# Patient Record
Sex: FEMALE | Race: WHITE | NOT HISPANIC OR LATINO | Employment: OTHER | URBAN - METROPOLITAN AREA
[De-identification: names, ages, dates, MRNs, and addresses within clinical notes are randomized per-mention and may not be internally consistent; named-entity substitution may affect disease eponyms.]

---

## 2020-10-25 ENCOUNTER — VIRTUAL VISIT (OUTPATIENT)
Dept: URGENT CARE | Facility: CLINIC | Age: 80
End: 2020-10-25
Payer: MEDICARE

## 2020-10-25 ENCOUNTER — NURSE TRIAGE (OUTPATIENT)
Dept: NURSING | Facility: CLINIC | Age: 80
End: 2020-10-25

## 2020-10-25 DIAGNOSIS — Z20.822 SUSPECTED 2019 NOVEL CORONAVIRUS INFECTION: Primary | ICD-10-CM

## 2020-10-25 PROCEDURE — 99441 PR PHYSICIAN TELEPHONE EVALUATION 5-10 MIN: CPT | Performed by: PHYSICIAN ASSISTANT

## 2020-10-25 NOTE — PATIENT INSTRUCTIONS
Patient Education     Coronavirus Disease 2019 (COVID-19): Caring for Yourself or Others   If you or a household member have symptoms of COVID-19, follow the guidelines below. This will help you manage symptoms and keep the virus from spreading.  If you have symptoms of COVID-19    Stay home and contact your care team. They will tell you what to do.    Don t panic. Keep in mind that other illnesses can cause similar symptoms.    Stay away from work, school, and public places.    Limit physical contact with others in your home. Limit visitors. No kissing.  Clean surfaces you touch with disinfectant.  If you need to cough or sneeze, do it into a tissue. Then throw the tissue into the trash. If you don't have tissues, cough or sneeze into the bend of your elbow.  Don t share food or personal items with people in your household. This includes items like eating and drinking utensils, towels, and bedding.  Wear a cloth face mask around other people. During a public health emergency, medical face masks may be reserved for healthcare workers. You may need to make a cloth face mask of your own. You can do this using a bandana, T-shirt, or other cloth. The CDC has instructions on how to make a face mask. Wear the mask so that it covers both your nose and mouth.  If you need to go to a hospital or clinic, call ahead to let them know. Expect the care team to wear masks, gowns, gloves, and eye protection. You may be put in a separate room.  Follow all instructions from your care team.    If you ve been diagnosed with COVID-19    Stay home and away from others, including other people in your home. (This is called self-isolation.) Don t leave home unless you need to get medical care. Don t go to work, school, or public places. Don t use buses, taxis, or other public transportation.    Follow all instructions from your care team.    If you need to go to a hospital or clinic, call ahead to let them know. Expect the care team to wear  masks, gowns, gloves, and eye protection. You may be put in a separate room.    Wear a face mask over your nose and mouth. This is to protect others from your germs. If you can t wear a mask, your caregivers should wear one. You may need to make your own mask using a bandana, T-shirt, or other cloth. See the [facility] s instructions on how to make a face mask.    Have no contact with pets and other animals.    Don t share food or personal items with people in your household. This includes items like eating and drinking utensils, towels, and bedding.    If you need to cough or sneeze, do it into a tissue. Then throw the tissue into the trash. If you don't have tissues, cough or sneeze into the bend of your elbow.    Wash your hands often.    Self-care at home   At this time, there is no medicine approved to prevent or treat COVID-19. Experts are testing different medicines, trying to find one that works.  So far, the most proven treatment is to support your body while it fights the virus.    Getting extra rest.    Drink extra fluids 6 to 8 glasses of liquids each day), unless a doctor has told you not to. Ask your care team which fluids are best for you. Avoid fluids that contain caffeine or alcohol.    Taking over-the-counter (OTC) medicine to reduce pain and fever. Your care team will tell you which medicine to use.  If you ve been in the hospital for COVID-19, follow your care team s instructions. They will tell you when to stop self-isolation. They may also have you change positions to help your breathing (such as lying on your belly).  If a test showed that you have COVID-19, you may be asked to donate plasma after you ve recovered. (This is called COVID-19 convalescent plasma donation.) The plasma may contain antibodies to help fight the virus in others. Scientists are testing whether this might be a treatment in the future. For more information, talk to your care team.  Caring for a sick person     Follow all  instructions from the care team.    Wash your hands often.    Wear protective clothing as advised.    Make sure the sick person wears a mask. If they can't wear a mask, don t stay in the same room with them. If you must be in the same room, wear a face mask. Make sure the mask covers both the nose and mouth.    Keep track of the sick person s symptoms.    Clean surfaces often with disinfectant. This includes phones, kitchen counters, fridge door handles, bathroom surfaces, and others.    Don t let anyone share household items with the sick person. This includes eating and drinking tools, towels, sheets, or blankets.    Clean fabrics and laundry well.    Keep other people and pets away from the sick person.    When you can stop self-isolation  When you are sick with COVID-19, you should stay away from other people. This is called self-isolation. The rules for ending self-isolation depend on your health in general.  If you are normally healthy  You can stop self-isolation when all 3 of these are true:    You ve had no fever--and no medicine that reduces fever--for at least 24 hours. And     Your symptoms are better, such as cough or trouble breathing. And     At least 10 days have passed since your symptoms first started.  Talk with your care team before you leave home. They may tell you it s okay to leave, or they may give you different advice.  If you have a weak immune system  If you re being treated for cancer, have an immune disorder (such as HIV), or have had a transplant &$40;organ or bone marrow), follow your care team s instructions. You may be asked to stay home from 10 days to 20 days after your symptoms first started. Your care team may want to re-test you for COVID-19.  When you return to public settings  When you are well enough to go outside your home, follow the CDC s guidance on cloth face masks.    Anyone over age 2 should wear a face mask in public, especially when it's hard to socially distance.  This includes public transit, public protests and marches, and crowded stores, bars, and restaurants.    Face masks are most likely to reduce the spread of COVID-19 when they are widely used by people who are out in the public.  Certain people should not wear a face covering. These include:    Children under 2 years old    Anyone with a health, developmental, or mental health condition that can be made worse by wearing a mask    Anyone who is unconscious or unable to remove the face covering without help. See the CDC's guidance on who should not wear a face mask.  When to call your care team  Call your care team right away if a sick person has any of these:    Trouble breathing    Pain or pressure in chest  If a sick person has any of these, call 911:    Trouble breathing that gets worse    Pain or pressure in chest that gets worse    Blue tint to lips or face    Fast or irregular heartbeat    Confusion or trouble waking    Fainting or loss of consciousness    Coughing up blood  Going home from the hospital   If you have COVID-19 and were recently in the hospital:    Follow the instructions above for self-care and isolation.    Follow the hospital care team s instructions.    Ask questions if anything is unclear to you. Write down answers so you remember them.  Date last modified: 10/13/2020  Lisa last reviewed this educational content on 4/1/2020  This information has been modified by your health care provider with permission from the publisher.    4822-8671 [employer]. [address] [place] [address], [place] [address]. All rights reserved. This information is not intended as a substitute for professional medical care. Always follow your healthcare professional's instructions.

## 2020-10-25 NOTE — TELEPHONE ENCOUNTER
Hx of IDDB .  Caller is complaining of fever 101 orally, scratchy throat, cough, sniffles in nose. Caller states she was at a gathering that included 6 people in a garage. Caller states 4 out of the 6 people there are having covid-19 symptoms. Triage guidelines recommend to [email or web address]. Caller verbalized and understands directives  FNA triage call :   Presenting problem : Pt called . Covid suspected Sx 10/24/20 , and exposed to ? Suspected covid Sx . Currently : 1&0 , eating and Homebound  activity and Blood sugar is 101 now.   Guideline used : Covid suspected A AH.   Disposition and recommendations :  COVID 19 Nurse Triage Plan/Patient Instructions    Please be aware that novel coronavirus (COVID-19) may be circulating in the community. If you develop symptoms such as fever, cough, or SOB or if you have concerns about the presence of another infection including coronavirus (COVID-19), please contact your health care provider or visit [email or web address].     Disposition/Instructions/ Pt instructed in Self isolation     Home care recommended. Follow home care protocol based instructions.  Virtual Visit with provider recommended. Reference Visit Selection Guide.    Thank you for taking steps to prevent the spread of this virus.  o Limit your contact with others.  o Wear a simple mask to cover your cough.  o Wash your hands well and often.  Caller verbalizes understanding and denies further questions and will call back if further symptoms to triage or questions  . Miranda Lowery RN  - [facility] Nurse Advisor     Reason for Disposition    [1] Fever > 101 F (38.3 C) AND [2] age > 60    Additional Information    Negative: SEVERE difficulty breathing (e.g., struggling for each breath, speaks in single words)    Negative: Difficult to awaken or acting confused (e.g., disoriented, slurred speech)    Negative: Bluish (or gray) lips or face now    Negative: Shock suspected (e.g., cold/pale/clammy skin, too weak to stand, low BP,  rapid pulse)    Negative: Sounds like a life-threatening emergency to the triager    Negative: [1] COVID-19 exposure AND [2] no symptoms    Negative: COVID-19 and Breastfeeding, questions about    Negative: [1] Adult with possible COVID-19 symptoms AND [2] triager concerned about severity of symptoms or other causes    Negative: SEVERE or constant chest pain or pressure (Exception: mild central chest pain, present only when coughing)    Negative: MODERATE difficulty breathing (e.g., speaks in phrases, SOB even at rest, pulse 100-120)    Negative: Patient sounds very sick or weak to the triager    Negative: MILD difficulty breathing (e.g., minimal/no SOB at rest, SOB with walking, pulse <100)    Negative: Chest pain or pressure    Negative: Fever > 103 F (39.4 C)    Protocols used: CORONAVIRUS (COVID-19) DIAGNOSED OR [id number] 8.4.20

## 2020-10-25 NOTE — PROGRESS NOTES
"Mercedes Gomez is a 80 year old female who is being evaluated via a billable telephone visit.      The patient has been notified of following:     \"This telephone visit will be conducted via a call between you and your physician/provider. We have found that certain health care needs can be provided without the need for a physical exam.  This service lets us provide the care you need with a short phone conversation.  If a prescription is necessary we can send it directly to your pharmacy.  If lab work is needed we can place an order for that and you can then stop by our lab to have the test done at a later time.    Telephone visits are billed at different rates depending on your insurance coverage. During this emergency period, for some insurers they may be billed the same as an in-person visit.  Please reach out to your insurance provider with any questions.    If during the course of the call the physician/provider feels a telephone visit is not appropriate, you will not be charged for this service.\"    Patient has given verbal consent for Telephone visit?  Yes    What phone number would you like to be contacted at?     How would you like to obtain your AVS? Mail a copy    Subjective     Mercedes Gomez is a 80 year old female who presents via phone visit today for the following health issues:  Cough and fever, congestion    HPI  Symptoms started yesterday  Fever of 101 this morning.   Cough is nonproductive. Has been taking diabetic cough medicine that helped clear her cough up last night.  Has stuffy nose and mild headache.   No vomiting/diarrhea.   No loss of taste or smell.   No chest pain or shortness of breath.  No sick or known COVID contacts.   She goes to physical therapy weekly.     Review of Systems   Constitutional, HEENT, cardiovascular, pulmonary, gi and gu systems are negative, except as otherwise noted.       Objective          Vitals:  No vitals were obtained today due to virtual " visit.    healthy, alert and no distress  PSYCH: Alert and oriented times 3; coherent speech, normal   rate and volume, able to articulate logical thoughts, able   to abstract reason, no tangential thoughts, no hallucinations   or delusions  Her affect is normal  RESP: No cough, no audible wheezing, able to talk in full sentences  Remainder of exam unable to be completed due to telephone visits    No results found for this or any previous visit (from the past 24 hour(s)).        Assessment/Plan:    Assessment & Plan     Mercedes was seen today for cough.    Diagnoses and all orders for this visit:    Suspected 2019 novel coronavirus infection  -     Symptomatic COVID-19 Virus (Coronavirus) by PCR; Future    Continue cough medicine as needed for cough.       Diagnosis and treatment plan were discussed with patient and/or parent. If symptoms worsen or do not improve in the next few days, follow-up with your primary care provider or visit an [facility] urgent care clinic location.  Patient verbalizes understanding of all things discussed. All questions were addressed and answered.       See Patient Instructions    Return in about 1 week (around 11/1/2020) for If not better, sooner if worsening.      Genoveva Mittal PA-C    Virtual Urgent Care  [facility] VIRTUAL URGENT CARE    Phone call duration:  7 minutes

## 2020-10-25 NOTE — TELEPHONE ENCOUNTER
[id number] Caller states she does not feel well and can't do the virtual visit with a provider on [facility]. Caller advised to call back at 0730 to have provider paged to discuss symptoms. Caller verbalized and understands directives.        Caller is complaining of fever 101 orally, scratchy throat, cough, sniffles in nose. Caller states she was at a gathering that included 6 people in a garage. Caller states 4 out of the 6 people there are having covid-19 symptoms. Triage guidelines recommend to [email or web address]. Caller verbalized and understands directives.  COVID 19 Nurse Triage Plan/Patient Instructions    Please be aware that novel coronavirus (COVID-19) may be circulating in the community. If you develop symptoms such as fever, cough, or SOB or if you have concerns about the presence of another infection including coronavirus (COVID-19), please contact your health care provider or visit [email or web address].     Disposition/Instructions    Virtual Visit with provider recommended. Reference Visit Selection Guide.    Thank you for taking steps to prevent the spread of this virus.  o Limit your contact with others.  o Wear a simple mask to cover your cough.  o Wash your hands well and often.    Resources    [facility]: About COVID-19: [email or web address]    CDC: What to Do If You're Sick: www.cdc.gov/coronavirus/2019-ncov/about/steps-when-sick.html    CDC: Ending Home Isolation: www.cdc.gov/coronavirus/2019-ncov/hcp/disposition-in-home-patients.html     CDC: Caring for Someone: www.cdc.gov/coronavirus/2019-ncov/if-you-are-sick/care-for-someone.html     [facility]: Interim Guidance for Hospital Discharge to Home: www.health.[facility].[place]/diseases/coronavirus/hcp/hospdischarge.pdf    [facility] clinical trials (COVID-19 research studies): [email or web address]-trials     Below are the COVID-19 hotlines at the [place] Department of [facility] ([facility]). Interpreters are available.   o For health  questions: Call 845-056-6741 or 1-424.692.1106 (7 a.m. to 7 p.m.)  o For questions about schools and childcare: Call 325-675-2756 or 1-628.793.5953 (7 a.m. to 7 p.m.)                     Reason for Disposition    [1] Fever > 101 F (38.3 C) AND [2] age > 60    Additional Information    Negative: SEVERE difficulty breathing (e.g., struggling for each breath, speaks in single words)    Negative: Difficult to awaken or acting confused (e.g., disoriented, slurred speech)    Negative: Bluish (or gray) lips or face now    Negative: Shock suspected (e.g., cold/pale/clammy skin, too weak to stand, low BP, rapid pulse)    Negative: Sounds like a life-threatening emergency to the triager    Negative: [1] COVID-19 exposure AND [2] no symptoms    Negative: COVID-19 and Breastfeeding, questions about    Negative: [1] Adult with possible COVID-19 symptoms AND [2] triager concerned about severity of symptoms or other causes    Negative: SEVERE or constant chest pain or pressure (Exception: mild central chest pain, present only when coughing)    Negative: MODERATE difficulty breathing (e.g., speaks in phrases, SOB even at rest, pulse 100-120)    Negative: Patient sounds very sick or weak to the triager    Negative: MILD difficulty breathing (e.g., minimal/no SOB at rest, SOB with walking, pulse <100)    Negative: Chest pain or pressure    Negative: Fever > 103 F (39.4 C)    Protocols used: CORONAVIRUS (COVID-19) DIAGNOSED OR [id number] 8.4.20

## 2021-07-02 ENCOUNTER — HOSPITAL ENCOUNTER (OUTPATIENT)
Facility: CLINIC | Age: 81
Setting detail: OBSERVATION
Discharge: HOME | End: 2021-07-02
Attending: EMERGENCY MEDICINE | Admitting: INTERNAL MEDICINE
Payer: MEDICARE

## 2021-07-02 VITALS
RESPIRATION RATE: 16 BRPM | HEART RATE: 76 BPM | DIASTOLIC BLOOD PRESSURE: 50 MMHG | OXYGEN SATURATION: 98 % | BODY MASS INDEX: 39.56 KG/M2 | SYSTOLIC BLOOD PRESSURE: 134 MMHG | WEIGHT: 215 LBS | HEIGHT: 62 IN | TEMPERATURE: 98.4 F

## 2021-07-02 DIAGNOSIS — Z79.01 LONG TERM (CURRENT) USE OF ANTICOAGULANTS: ICD-10-CM

## 2021-07-02 DIAGNOSIS — Z11.52 ENCOUNTER FOR SCREENING LABORATORY TESTING FOR COVID-19 VIRUS: ICD-10-CM

## 2021-07-02 DIAGNOSIS — M62.81 GENERALIZED MUSCLE WEAKNESS: ICD-10-CM

## 2021-07-02 DIAGNOSIS — I48.91 ATRIAL FIBRILLATION WITH RAPID VENTRICULAR RESPONSE (H): ICD-10-CM

## 2021-07-02 PROBLEM — Z95.818 PRESENCE OF OTHER CARDIAC IMPLANTS AND GRAFTS: Status: ACTIVE | Noted: 2020-07-23

## 2021-07-02 LAB
ALBUMIN SERPL-MCNC: 3.5 G/DL (ref 3.4–5)
ALBUMIN UR-MCNC: NEGATIVE MG/DL
ALP SERPL-CCNC: 86 U/L (ref 40–150)
ALT SERPL W P-5'-P-CCNC: 18 U/L (ref 0–50)
ANION GAP SERPL CALCULATED.3IONS-SCNC: 2 MMOL/L (ref 3–14)
APPEARANCE UR: CLEAR
AST SERPL W P-5'-P-CCNC: 20 U/L (ref 0–45)
BASOPHILS # BLD AUTO: 0.1 10E9/L (ref 0–0.2)
BASOPHILS NFR BLD AUTO: 0.6 %
BILIRUB SERPL-MCNC: 0.6 MG/DL (ref 0.2–1.3)
BILIRUB UR QL STRIP: NEGATIVE
BUN SERPL-MCNC: 22 MG/DL (ref 7–30)
CALCIUM SERPL-MCNC: 8.8 MG/DL (ref 8.5–10.1)
CHLORIDE SERPL-SCNC: 107 MMOL/L (ref 94–109)
CO2 SERPL-SCNC: 32 MMOL/L (ref 20–32)
COLOR UR AUTO: YELLOW
CREAT SERPL-MCNC: 1.09 MG/DL (ref 0.52–1.04)
DIFFERENTIAL METHOD BLD: NORMAL
EOSINOPHIL # BLD AUTO: 0.1 10E9/L (ref 0–0.7)
EOSINOPHIL NFR BLD AUTO: 1.6 %
ERYTHROCYTE [DISTWIDTH] IN BLOOD BY AUTOMATED COUNT: 13.1 % (ref 10–15)
GFR SERPL CREATININE-BSD FRML MDRD: 47 ML/MIN/{1.73_M2}
GLUCOSE SERPL-MCNC: 157 MG/DL (ref 70–99)
GLUCOSE UR STRIP-MCNC: NEGATIVE MG/DL
HCT VFR BLD AUTO: 43.7 % (ref 35–47)
HGB BLD-MCNC: 14.3 G/DL (ref 11.7–15.7)
HGB UR QL STRIP: NEGATIVE
IMM GRANULOCYTES # BLD: 0 10E9/L (ref 0–0.4)
IMM GRANULOCYTES NFR BLD: 0.4 %
KETONES UR STRIP-MCNC: NEGATIVE MG/DL
LABORATORY COMMENT REPORT: NORMAL
LACTATE BLD-SCNC: 0.8 MMOL/L (ref 0.7–2)
LEUKOCYTE ESTERASE UR QL STRIP: NEGATIVE
LYMPHOCYTES # BLD AUTO: 2 10E9/L (ref 0.8–5.3)
LYMPHOCYTES NFR BLD AUTO: 23.6 %
MCH RBC QN AUTO: 30.1 PG (ref 26.5–33)
MCHC RBC AUTO-ENTMCNC: 32.7 G/DL (ref 31.5–36.5)
MCV RBC AUTO: 92 FL (ref 78–100)
MONOCYTES # BLD AUTO: 0.6 10E9/L (ref 0–1.3)
MONOCYTES NFR BLD AUTO: 7.2 %
NEUTROPHILS # BLD AUTO: 5.7 10E9/L (ref 1.6–8.3)
NEUTROPHILS NFR BLD AUTO: 66.6 %
NITRATE UR QL: NEGATIVE
NRBC # BLD AUTO: 0 10*3/UL
NRBC BLD AUTO-RTO: 0 /100
PH UR STRIP: 7 PH (ref 5–7)
PLATELET # BLD AUTO: 225 10E9/L (ref 150–450)
POTASSIUM SERPL-SCNC: 4.2 MMOL/L (ref 3.4–5.3)
PROT SERPL-MCNC: 7.3 G/DL (ref 6.8–8.8)
RBC # BLD AUTO: 4.75 10E12/L (ref 3.8–5.2)
SARS-COV-2 RNA RESP QL NAA+PROBE: NEGATIVE
SODIUM SERPL-SCNC: 141 MMOL/L (ref 133–144)
SOURCE: NORMAL
SP GR UR STRIP: 1.01 (ref 1–1.03)
SPECIMEN SOURCE: NORMAL
TROPONIN I SERPL-MCNC: <0.015 UG/L (ref 0–0.04)
TSH SERPL DL<=0.005 MIU/L-ACNC: 1.4 MU/L (ref 0.4–4)
UROBILINOGEN UR STRIP-MCNC: 0 MG/DL (ref 0–2)
WBC # BLD AUTO: 8.5 10E9/L (ref 4–11)

## 2021-07-02 PROCEDURE — 93010 ELECTROCARDIOGRAM REPORT: CPT | Performed by: EMERGENCY MEDICINE

## 2021-07-02 PROCEDURE — 80053 COMPREHEN METABOLIC PANEL: CPT | Performed by: EMERGENCY MEDICINE

## 2021-07-02 PROCEDURE — G0378 HOSPITAL OBSERVATION PER HR: HCPCS

## 2021-07-02 PROCEDURE — 84443 ASSAY THYROID STIM HORMONE: CPT | Performed by: EMERGENCY MEDICINE

## 2021-07-02 PROCEDURE — 87635 SARS-COV-2 COVID-19 AMP PRB: CPT | Performed by: EMERGENCY MEDICINE

## 2021-07-02 PROCEDURE — 93005 ELECTROCARDIOGRAM TRACING: CPT | Mod: 76 | Performed by: EMERGENCY MEDICINE

## 2021-07-02 PROCEDURE — 96374 THER/PROPH/DIAG INJ IV PUSH: CPT | Performed by: EMERGENCY MEDICINE

## 2021-07-02 PROCEDURE — 258N000003 HC RX IP 258 OP 636: Performed by: EMERGENCY MEDICINE

## 2021-07-02 PROCEDURE — 83605 ASSAY OF LACTIC ACID: CPT | Performed by: EMERGENCY MEDICINE

## 2021-07-02 PROCEDURE — 85025 COMPLETE CBC W/AUTO DIFF WBC: CPT | Performed by: EMERGENCY MEDICINE

## 2021-07-02 PROCEDURE — 84484 ASSAY OF TROPONIN QUANT: CPT | Performed by: EMERGENCY MEDICINE

## 2021-07-02 PROCEDURE — 81003 URINALYSIS AUTO W/O SCOPE: CPT | Performed by: EMERGENCY MEDICINE

## 2021-07-02 PROCEDURE — 93010 ELECTROCARDIOGRAM REPORT: CPT | Mod: 76 | Performed by: EMERGENCY MEDICINE

## 2021-07-02 PROCEDURE — C9803 HOPD COVID-19 SPEC COLLECT: HCPCS | Performed by: EMERGENCY MEDICINE

## 2021-07-02 PROCEDURE — 250N000009 HC RX 250: Performed by: EMERGENCY MEDICINE

## 2021-07-02 PROCEDURE — 99285 EMERGENCY DEPT VISIT HI MDM: CPT | Mod: 25 | Performed by: EMERGENCY MEDICINE

## 2021-07-02 PROCEDURE — 99218 PR INITIAL OBSERVATION CARE,LEVEL I: CPT | Mod: AI | Performed by: PHYSICIAN ASSISTANT

## 2021-07-02 PROCEDURE — 96361 HYDRATE IV INFUSION ADD-ON: CPT | Performed by: EMERGENCY MEDICINE

## 2021-07-02 PROCEDURE — 93005 ELECTROCARDIOGRAM TRACING: CPT | Performed by: EMERGENCY MEDICINE

## 2021-07-02 RX ORDER — METOPROLOL SUCCINATE 25 MG/1
25 TABLET, EXTENDED RELEASE ORAL
Status: ON HOLD | COMMUNITY
Start: 2020-06-30 | End: 2021-07-02

## 2021-07-02 RX ORDER — CALCIUM CARBONATE 500(1250)
1250 TABLET,CHEWABLE ORAL
COMMUNITY
Start: 2020-06-30

## 2021-07-02 RX ORDER — DILTIAZEM HCL 60 MG
60 TABLET ORAL EVERY 6 HOURS SCHEDULED
Status: DISCONTINUED | OUTPATIENT
Start: 2021-07-02 | End: 2021-07-02

## 2021-07-02 RX ORDER — LISINOPRIL 5 MG/1
5 TABLET ORAL
COMMUNITY
Start: 2020-06-30

## 2021-07-02 RX ORDER — PRAVASTATIN SODIUM 40 MG
40 TABLET ORAL
COMMUNITY
Start: 2020-12-19

## 2021-07-02 RX ORDER — PROCHLORPERAZINE 25 MG/1
SUPPOSITORY RECTAL
COMMUNITY
Start: 2021-05-14

## 2021-07-02 RX ORDER — DILTIAZEM HYDROCHLORIDE 5 MG/ML
15 INJECTION INTRAVENOUS ONCE
Status: COMPLETED | OUTPATIENT
Start: 2021-07-02 | End: 2021-07-02

## 2021-07-02 RX ORDER — PROCHLORPERAZINE 25 MG/1
SUPPOSITORY RECTAL
COMMUNITY
Start: 2021-04-30

## 2021-07-02 RX ORDER — METOPROLOL SUCCINATE 25 MG/1
50 TABLET, EXTENDED RELEASE ORAL DAILY
Qty: 30 TABLET | Refills: 0 | Status: SHIPPED | OUTPATIENT
Start: 2021-07-02

## 2021-07-02 RX ADMIN — SODIUM CHLORIDE 500 ML: 9 INJECTION, SOLUTION INTRAVENOUS at 15:07

## 2021-07-02 RX ADMIN — DILTIAZEM HYDROCHLORIDE 15 MG: 5 INJECTION INTRAVENOUS at 18:33

## 2021-07-02 ASSESSMENT — MIFFLIN-ST. JEOR: SCORE: 1388.48

## 2021-07-02 NOTE — ED PROVIDER NOTES
"  History     Chief Complaint   Patient presents with     Fatigue     Pt reports feeling tired and fatigue with activity. Pt reports she feels better at rest. Pt does have a hx of afib with an ablation in the past, pt is currently taking xarelto. Pt reports this morning she felt like she had an \"afib attack\" that lasted about an hour.      HPI  Mercedes Gomez is a 80 year old female who presents with fatigue, dyspnea on exertion all began this morning after about an hour worth of \"A. fib\".  She has infrequent paroxysmal atrial fibrillation, chronic anticoagulation rivaroxaban, reports compliance with same.  Denies leg pain or swelling, no history of DVT/pulmonary embolism.  Type I diabetic insulin pump.  No recent febrile illness cough congestion sore throat nausea vomiting diarrhea black or bloody stool.  Denies urinary symptoms.  Is vaccinated for Covid.    Allergies:  Allergies   Allergen Reactions     Adhesive Tape Rash     Sulfa Drugs Rash       Problem List:    Patient Active Problem List    Diagnosis Date Noted     Generalized muscle weakness 07/02/2021     Priority: Medium     Atrial fibrillation with rapid ventricular response (H) 07/02/2021     Priority: Medium     Presence of other cardiac implants and grafts 07/23/2020     Priority: Medium     Overactive bladder 04/28/2014     Priority: Medium     Anxiety 08/26/2013     Priority: Medium     Paroxysmal atrial fibrillation (H) 08/26/2013     Priority: Medium     Humerus fracture 09/17/2012     Priority: Medium     Proximal humeral fracture 09/17/2012     Priority: Medium     Insulin pump status 05/08/2009     Priority: Medium     Formatting of this note might be different from the original.  PLACED 4-       Type 1 diabetes mellitus without complication (H) 10/01/2008     Priority: Medium     Formatting of this note might be different from the original.  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  Presence of other cardiac implants and grafts 07/23/2020   Multinodular goiter 12/26/2017   Overview:     Formatting of this note might be different from the original.  stable by ultrasound 11/17; recommend repeat ultrasound 11/19     Severe obesity (BMI 35.0-39.9) with comorbidity 02/24/2017   Overactive bladder 04/28/2014   Osteopenia 10/22/2013   Paroxysmal atrial fibrillation 08/26/2013   Anxiety 08/26/2013   Proteinuria with type 1 diabetes mellitus 04/11/2013   Insulin pump status 05/08/2009   Overview:     Formatting of this note might be different from the original.  PLACED 4-     Type 1 diabetes mellitus without complication 10/01/2008   Overview:     Formatting of this note might be different from the original.  diagnosis 1991 with type 2; c-peptide low as of 2008  history of albuminuria     Hyperlipidemia due to type 1 diabetes mellitus 09/25/2007   Disturbances of sensation of smell and taste     Overview:     Formatting of this note might be different from the original.       Past Surgical History:    No past surgical history on file.    Family History:    Family History   Problem Relation Age of Onset     Alzheimer Disease Mother        Social History:  Marital Status:   [2]  Social History     Tobacco Use     Smoking status: Never Smoker   Substance Use Topics     Alcohol use: No     Drug use: No        Medications:    ACCU-CHEK ACTIVE  calcium carbonate 1250 (500 Ca) MG CHEW  Continuous Blood Gluc  (DEXCOM G6 ) FABIAN  Continuous Blood Gluc Sensor (DEXCOM G6 SENSOR) MISC  insulin aspart (NOVOLOG VIAL) 100 UNITS/ML vial  lisinopril (ZESTRIL) 5 MG tablet  metoprolol succinate ER (TOPROL-XL) 25 MG 24 hr tablet  NOVOLOG 100 UNIT/ML SC SOLN  pravastatin (PRAVACHOL) 40 MG tablet  rivaroxaban ANTICOAGULANT (XARELTO) 20 MG TABS  "tablet  SUBCUTANEOUS INF PUMP SUPPLIES  vitamin B-12 (CYANOCOBALAMIN) 500 MCG tablet  VITAMIN D 1000 UNIT OR CAPS  CRANBERRY OR          Review of Systems  All other systems reviewed and are negative.    Physical Exam   BP: (!) 148/67  Pulse: 131  Temp: 97.6  F (36.4  C)  Resp: 20  Height: 157.5 cm (5' 2\")  Weight: 97.5 kg (215 lb)  SpO2: 97 %      Physical Exam  Nontoxic appearing no respiratory distress alert and oriented ×3  Head atraumatic normocephalic  Neck supple full active painless range of motion  Lungs clear to auscultation  Heart regular tachycardic no murmur  Abdomen soft obese nontender bowel sounds positive   Strength and sensation grossly intact throughout the extremities, gait and station normal  Speech is fluent, good eye contact, thought processes are rational  Lower extremities without swelling, redness or tenderness  Pedal pulses symmetrical and strong    ED Course        Procedures  EKG time 1356, sinus tachycardia rate 131, partial right bundle branch block, no acute ST or T wave changes, read by Dr. Theron Wilson    EKG atrial fibrillation rate 118, right bundle branch block, inferior Q waves, no acute ST or T wave changes read by Dr. Theron Wilson    EKG post IV of diltiazem, rate 70, normal sinus rhythm, 1 PVC, inferior Q waves, no acute ST or T wave changes, read by Dr. Theron Wilson             Critical Care time:  none     Results for orders placed or performed during the hospital encounter of 07/02/21   CBC with platelets differential     Status: None   Result Value Ref Range    WBC 8.5 4.0 - 11.0 10e9/L    RBC Count 4.75 3.8 - 5.2 10e12/L    Hemoglobin 14.3 11.7 - 15.7 g/dL    Hematocrit 43.7 35.0 - 47.0 %    MCV 92 78 - 100 fl    MCH 30.1 26.5 - 33.0 pg    MCHC 32.7 31.5 - 36.5 g/dL    RDW 13.1 10.0 - 15.0 %    Platelet Count 225 150 - 450 10e9/L    Diff Method Automated Method     % Neutrophils 66.6 %    % Lymphocytes 23.6 %    % Monocytes 7.2 %    % Eosinophils 1.6 %    % Basophils " 0.6 %    % Immature Granulocytes 0.4 %    Nucleated RBCs 0 0 /100    Absolute Neutrophil 5.7 1.6 - 8.3 10e9/L    Absolute Lymphocytes 2.0 0.8 - 5.3 10e9/L    Absolute Monocytes 0.6 0.0 - 1.3 10e9/L    Absolute Eosinophils 0.1 0.0 - 0.7 10e9/L    Absolute Basophils 0.1 0.0 - 0.2 10e9/L    Abs Immature Granulocytes 0.0 0 - 0.4 10e9/L    Absolute Nucleated RBC 0.0    Comprehensive metabolic panel     Status: Abnormal   Result Value Ref Range    Sodium 141 133 - 144 mmol/L    Potassium 4.2 3.4 - 5.3 mmol/L    Chloride 107 94 - 109 mmol/L    Carbon Dioxide 32 20 - 32 mmol/L    Anion Gap 2 (L) 3 - 14 mmol/L    Glucose 157 (H) 70 - 99 mg/dL    Urea Nitrogen 22 7 - 30 mg/dL    Creatinine 1.09 (H) 0.52 - 1.04 mg/dL    GFR Estimate 47 (L) >60 mL/min/[1.73_m2]    GFR Estimate If Black 54 (L) >60 mL/min/[1.73_m2]    Calcium 8.8 8.5 - 10.1 mg/dL    Bilirubin Total 0.6 0.2 - 1.3 mg/dL    Albumin 3.5 3.4 - 5.0 g/dL    Protein Total 7.3 6.8 - 8.8 g/dL    Alkaline Phosphatase 86 40 - 150 U/L    ALT 18 0 - 50 U/L    AST 20 0 - 45 U/L   Lactic acid whole blood     Status: None   Result Value Ref Range    Lactic Acid 0.8 0.7 - 2.0 mmol/L   Troponin I     Status: None   Result Value Ref Range    Troponin I ES <0.015 0.000 - 0.045 ug/L   TSH with free T4 reflex     Status: None   Result Value Ref Range    TSH 1.40 0.40 - 4.00 mU/L   UA reflex to Microscopic     Status: None   Result Value Ref Range    Color Urine Yellow     Appearance Urine Clear     Glucose Urine Negative NEG^Negative mg/dL    Bilirubin Urine Negative NEG^Negative    Ketones Urine Negative NEG^Negative mg/dL    Specific Gravity Urine 1.008 1.003 - 1.035    Blood Urine Negative NEG^Negative    pH Urine 7.0 5.0 - 7.0 pH    Protein Albumin Urine Negative NEG^Negative mg/dL    Urobilinogen mg/dL 0.0 0.0 - 2.0 mg/dL    Nitrite Urine Negative NEG^Negative    Leukocyte Esterase Urine Negative NEG^Negative    Source Midstream Urine    Asymptomatic SARS-CoV-2 COVID-19 Virus  (Coronavirus) by PCR     Status: None    Specimen: Nasopharyngeal   Result Value Ref Range    SARS-CoV-2 Virus Specimen Source Nasopharyngeal     SARS-CoV-2 PCR Result NEGATIVE     SARS-CoV-2 PCR Comment (Note)                  No results found for this or any previous visit (from the past 24 hour(s)).    Medications   0.9% sodium chloride BOLUS (0 mLs Intravenous Stopped 7/2/21 1834)   diltiazem (CARDIZEM) injection 15 mg (15 mg Intravenous Given 7/2/21 1833)       Assessments & Plan (with Medical Decision Making)  80-year-old female arrives with tachycardia shortness of air general weakness, found to have heart rate in the 130 range, initially interpreted as sinus tachycardia although believe in retrospect likely A. fib with RVR.  Ultimately treated with diltiazem converted to normal sinus rhythm.  Given age, generalized weakness, patient is admitted for telemetry, ongoing treatment of heart rate.  Reviewed with hospitalist service who accepts     I have reviewed the nursing notes.    I have reviewed the findings, diagnosis, plan and need for follow up with the patient.       Discharge Medication List as of 7/2/2021  8:39 PM          Final diagnoses:   Atrial fibrillation with rapid ventricular response (H)   Generalized muscle weakness       7/2/2021   [facility] EMERGENCY DEPT     Theron Wilson MD  07/04/21 0231

## 2021-07-03 NOTE — PLAN OF CARE
"[facility] [place] ADMISSION NOTE    Patient admitted to room 2303 at approximately 2005 via wheel chair from emergency room. Patient was accompanied by transport tech.     Verbal SBAR report received from Terri CORREA prior to patient arrival.     Patient ambulated to bed independently. Patient alert and oriented X 3. The patient is not having any pain.  . Admission vital signs: Blood pressure 134/50, pulse 76, temperature 98.4  F (36.9  C), temperature source Oral, resp. rate 16, height 1.575 m (5' 2\"), weight 97.5 kg (215 lb), SpO2 98 %. Patient was oriented to plan of care, call light, bed controls, tv, telephone, bathroom, and visiting hours.     Risk Assessment    The following safety risks were identified during admission: none. Yellow risk band applied: YES.     Skin Initial Assessment    This writer admitted this patient and completed a full skin assessment and Michael score in the Adult PCS flowsheet. Appropriate interventions initiated as needed.   Pateient has a small open area right upper back and scab on RUQ ( \"from her insulin pump dressing\")         Education    Patient has a [place] to Observation order: Yes  Observation education completed and documented: Yes Per Chandana CORREA ED charge she gave the brochure and documented in the their observation navigator.       Nadira Norwood RN      "

## 2021-07-03 NOTE — DISCHARGE INSTRUCTIONS
For the atrial fibrillation to help control your heart rate:  Increase your metoprolol from 25 to 50 mg daily.  - On 7/3 take your usual 25 mg dose early (anytime after midnight), then take a second 25 mg later in the morning  - On 7/4 take 50 mg of metoprolol together in the morning, and continue at this dose going forward  - Monitor your blood pressure and pulse twice daily to ensure it is not too low at this higher dose  - Keep taking your Xarelto, no changes in dose

## 2021-07-03 NOTE — H&P
Admitted and discharged same day. See discharge summary.    Hanny Dailey PA-C  [facility] Service

## 2021-07-03 NOTE — ED PROVIDER NOTES
"     Emergency Department Patient Sign-out       Brief HPI:  This is a 80 year old female signed out to me by Dr. Wilson .  See initial ED Provider note for details of the presentation.            Significant Events prior to my assuming care: Lab diagnostics, imaging.      Exam:   Patient Vitals for the past 24 hrs:   BP Temp Temp src Pulse Resp SpO2 Height Weight   07/02/21 1900 111/50 -- -- 67 14 99 % -- --   07/02/21 1855 -- -- -- 63 16 99 % -- --   07/02/21 1853 120/56 -- -- 73 17 99 % -- --   07/02/21 1845 103/53 -- -- 66 14 99 % -- --   07/02/21 1843 -- -- -- 79 -- 98 % -- --   07/02/21 1842 -- -- -- 87 17 99 % -- --   07/02/21 1841 -- -- -- 80 17 99 % -- --   07/02/21 1840 -- -- -- 87 14 99 % -- --   07/02/21 1837 113/52 -- -- 74 16 100 % -- --   07/02/21 1835 113/52 -- -- 124 17 100 % -- --   07/02/21 1830 133/68 -- -- 123 20 100 % -- --   07/02/21 1800 109/75 -- -- 122 17 99 % -- --   07/02/21 1745 116/73 -- -- 120 20 100 % -- --   07/02/21 1730 96/67 -- -- 114 19 98 % -- --   07/02/21 1630 114/78 -- -- 117 16 100 % -- --   07/02/21 1600 110/73 -- -- 123 13 100 % -- --   07/02/21 1530 105/70 -- -- 118 15 99 % -- --   07/02/21 1528 -- -- -- 116 15 99 % -- --   07/02/21 1501 -- -- -- 122 15 99 % -- --   07/02/21 1500 108/66 -- -- 120 17 99 % -- --   07/02/21 1440 -- -- -- 126 12 100 % -- --   07/02/21 1430 131/66 -- -- 126 14 99 % -- --   07/02/21 1420 133/79 -- -- 128 26 99 % -- --   07/02/21 1400 -- -- -- 130 16 99 % -- --   07/02/21 1350 128/74 -- -- 132 -- 96 % -- --   07/02/21 1341 (!) 148/67 97.6  F (36.4  C) Temporal 131 20 97 % 1.575 m (5' 2\") 97.5 kg (215 lb)           ED RESULTS:   Results for orders placed or performed during the hospital encounter of 07/02/21 (from the past 24 hour(s))   CBC with platelets differential     Status: None    Collection Time: 07/02/21  1:59 PM   Result Value Ref Range    WBC 8.5 4.0 - 11.0 10e9/L    RBC Count 4.75 3.8 - 5.2 10e12/L    Hemoglobin 14.3 11.7 - 15.7 g/dL "    Hematocrit 43.7 35.0 - 47.0 %    MCV 92 78 - 100 fl    MCH 30.1 26.5 - 33.0 pg    MCHC 32.7 31.5 - 36.5 g/dL    RDW 13.1 10.0 - 15.0 %    Platelet Count 225 150 - 450 10e9/L    Diff Method Automated Method     % Neutrophils 66.6 %    % Lymphocytes 23.6 %    % Monocytes 7.2 %    % Eosinophils 1.6 %    % Basophils 0.6 %    % Immature Granulocytes 0.4 %    Nucleated RBCs 0 0 /100    Absolute Neutrophil 5.7 1.6 - 8.3 10e9/L    Absolute Lymphocytes 2.0 0.8 - 5.3 10e9/L    Absolute Monocytes 0.6 0.0 - 1.3 10e9/L    Absolute Eosinophils 0.1 0.0 - 0.7 10e9/L    Absolute Basophils 0.1 0.0 - 0.2 10e9/L    Abs Immature Granulocytes 0.0 0 - 0.4 10e9/L    Absolute Nucleated RBC 0.0    Comprehensive metabolic panel     Status: Abnormal    Collection Time: 07/02/21  1:59 PM   Result Value Ref Range    Sodium 141 133 - 144 mmol/L    Potassium 4.2 3.4 - 5.3 mmol/L    Chloride 107 94 - 109 mmol/L    Carbon Dioxide 32 20 - 32 mmol/L    Anion Gap 2 (L) 3 - 14 mmol/L    Glucose 157 (H) 70 - 99 mg/dL    Urea Nitrogen 22 7 - 30 mg/dL    Creatinine 1.09 (H) 0.52 - 1.04 mg/dL    GFR Estimate 47 (L) >60 mL/min/[1.73_m2]    GFR Estimate If Black 54 (L) >60 mL/min/[1.73_m2]    Calcium 8.8 8.5 - 10.1 mg/dL    Bilirubin Total 0.6 0.2 - 1.3 mg/dL    Albumin 3.5 3.4 - 5.0 g/dL    Protein Total 7.3 6.8 - 8.8 g/dL    Alkaline Phosphatase 86 40 - 150 U/L    ALT 18 0 - 50 U/L    AST 20 0 - 45 U/L   Lactic acid whole blood     Status: None    Collection Time: 07/02/21  1:59 PM   Result Value Ref Range    Lactic Acid 0.8 0.7 - 2.0 mmol/L   Troponin I     Status: None    Collection Time: 07/02/21  1:59 PM   Result Value Ref Range    Troponin I ES <0.015 0.000 - 0.045 ug/L   TSH with free T4 reflex     Status: None    Collection Time: 07/02/21  1:59 PM   Result Value Ref Range    TSH 1.40 0.40 - 4.00 mU/L   UA reflex to Microscopic     Status: None    Collection Time: 07/02/21  3:01 PM   Result Value Ref Range    Color Urine Yellow     Appearance Urine  Clear     Glucose Urine Negative NEG^Negative mg/dL    Bilirubin Urine Negative NEG^Negative    Ketones Urine Negative NEG^Negative mg/dL    Specific Gravity Urine 1.008 1.003 - 1.035    Blood Urine Negative NEG^Negative    pH Urine 7.0 5.0 - 7.0 pH    Protein Albumin Urine Negative NEG^Negative mg/dL    Urobilinogen mg/dL 0.0 0.0 - 2.0 mg/dL    Nitrite Urine Negative NEG^Negative    Leukocyte Esterase Urine Negative NEG^Negative    Source Midstream Urine        ED MEDICATIONS:   Medications   0.9% sodium chloride BOLUS (0 mLs Intravenous Stopped 7/2/21 1834)   diltiazem (CARDIZEM) tablet 60 mg (0 mg Oral Hold 7/2/21 1910)   0.9% sodium chloride BOLUS (0 mLs Intravenous Stopped 7/2/21 1834)   diltiazem (CARDIZEM) injection 15 mg (15 mg Intravenous Given 7/2/21 1833)         Impression:    ICD-10-CM    1. Atrial fibrillation with rapid ventricular response (H)  I48.91 Asymptomatic SARS-CoV-2 COVID-19 Virus (Coronavirus) by PCR   2. Generalized muscle weakness  M62.81        Plan:    Pending studies include no further pending studies, awaiting bed for admission here..        MD Simeon Howell, Uri Benitez MD  07/02/21 1913

## 2021-07-03 NOTE — DISCHARGE SUMMARY
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  No follow up labs or test are needed.     Please review patient's blood pressure log at her next clinic visit.    Unresulted Labs Ordered in the Past 30 Days of this Admission     No orders found from 6/2/2021 to 7/3/2021.      These results will be followed up by N/A    Discharge Disposition   Discharged to home  Condition at discharge: Stable      Hospital Course     Atrial fibrillation with rapid ventricular response  Paroxysmal atrial fibrillation  Known history of paroxysmal atrial fibrillation. Rate controlled prior to admission with metoprolol XL 25 mg daily. Anticoagulated prior to admission with Xarelto 20 mg q pm. Presented with rate in 130's despite compliance with her home metoprolol.   - IV Cardizem 15 mg given in the emergency department, patient converted back to NSR prior to discharge  - Patient to increase metoprolol XL to 50 mg daily if blood pressure allows  - Continue Xarelto  - Patient to monitor her blood pressure and heart rate twice daily and bring her records to her follow-up visit with PCP  - Patient instructed to follow-up with PCP within 7 days    Type 1 diabetes mellitus without complication  Insulin pump status  Has insulin pump with Novolog, continue at discharge.    Hypertension    Managed prior to admission with lisinopril 5 mg daily, continued at discharge.       Consultations This Hospital Stay   None    Code Status   Prior    Time Spent on this Encounter   I, Hanny Dailey PA-C, personally saw the patient today and spent less than or equal to 30 minutes discharging this patient.       Hanny Dailey PA-C  [facility]  [address] [address]  Phone: [phone]  Fax: [phone]  ______________________________________________________________________    Physical Exam   Vital Signs: Temp: 98.4  F (36.9  C) Temp src: Oral BP: 134/50 Pulse: 76   Resp: 16 SpO2: 98 % O2 Device: None (Room air)    Weight: 215 lbs 0 oz    General appearance: Awake, alert, and in no apparent distress. Pleasant and conversational, speaking in full sentences.  CV: Regular rhythm & rate, no murmurs. No edema. Peripheral pulses intact.  Respiratory: Moving air well bilaterally, no wheezing, crackles, or rhonchi.  GI: Non-distended, soft, nontender to palpation. No rebound or guarding. Normoactive bowel sounds.  Skin: Warm, dry, no rashes or ecchymoses. No mottling of skin.  Musculoskeletal / extremities: Moves all extremities equally, no obvious abnormalities. Distal CMS intact.  Neurologic: No focal deficits.         Primary Care Physician   Santiago Crane    Discharge Orders      Reason for your hospital stay    You had atrial fibrillation with rapid heart rate. This resolved and you are stable to go home. We recommend you increase your metoprolol XL to 50 mg daily.     Follow-up and recommended labs and tests     Follow up with primary care provider, Santiago Crane, within 7 days for hospital follow- up.  No follow up labs or test are needed.     Activity    Your activity upon discharge: activity as tolerated     Monitor and record    blood pressure twice daily. Bring your recordings to your next appointment with Dr. Crane.     When to contact your  care team    Call your primary doctor if you have any of the following: increased heart rate > 100 or decreased heart rate < 60, lightheadedness, low blood pressure.     Discharge Instructions    For the atrial fibrillation to help control your heart rate:  Increase your metoprolol from 25 to 50 mg daily.  - On 7/3 take your usual 25 mg dose early (anytime after midnight), then take a second 25 mg later in the morning  - On 7/4 take 50 mg of metoprolol together in the morning  - Monitor your blood pressure and pulse twice daily to ensure it is not too low at this higher dose  - Keep taking your Xarelto, no changes in dose     Diet    Follow this diet upon discharge: Orders Placed This Encounter      Low Saturated Fat Na <2400 mg       Significant Results and Procedures   Most Recent 3 CBC's:  Recent Labs   Lab Test 07/02/21  1359   WBC 8.5   HGB 14.3   MCV 92        Most Recent 3 BMP's:  Recent Labs   Lab Test 07/02/21  1359      POTASSIUM 4.2   CHLORIDE 107   CO2 32   BUN 22   CR 1.09*   ANIONGAP 2*   DANG 8.8   *     Most Recent 3 INR's:No lab results found.,   Results for orders placed or performed in visit on 11/09/12   X-ray lt Shoulder G/E 3 vw    Impression       SHOULDER G/E 2 VIEWS LEFT 11/9/2012 9:01 AM     HISTORY: Left proximal humerus fracture     IMPRESSION: Proximal humeral surgical neck fracture, alignment and  appearance unchanged from 10/8/2012. No callus formation is visible.       Discharge Medications   Current Discharge Medication List      CONTINUE these medications which have CHANGED    Details   metoprolol succinate ER (TOPROL-XL) 25 MG 24 hr tablet Take 2 tablets (50 mg) by mouth daily  Qty: 30 tablet, Refills: 0    Associated Diagnoses: Atrial fibrillation with rapid ventricular response (H)         CONTINUE these medications which have NOT CHANGED    Details   ACCU-CHEK ACTIVE CHECK 4-6 TIMES DAILY      calcium carbonate 1250 (500 Ca) MG CHEW Take 1,250 mg by mouth       Continuous Blood Gluc  (DEXCOM G6 ) FABIAN As directed. This is a replacement ; she does not need the sensors or transmitter.      Continuous Blood Gluc Sensor (DEXCOM G6 SENSOR) MISC As directed.      !! insulin aspart (NOVOLOG VIAL) 100 UNITS/ML vial INJECT UP TO 60 UNITS VIA PUMP DAILY      lisinopril (ZESTRIL) 5 MG tablet Take 5 mg by mouth      !! NOVOLOG 100 UNIT/ML SC SOLN via PUMP, up to 60 units daily      pravastatin (PRAVACHOL) 40 MG tablet Take 40 mg by mouth      rivaroxaban ANTICOAGULANT (XARELTO) 20 MG TABS tablet TAKE 1 TABLET BY MOUTH ONCE DAILY WITH EVENING MEAL.      SUBCUTANEOUS INF PUMP SUPPLIES PT HAS ANIMAS PUMP      vitamin B-12 (CYANOCOBALAMIN) 500 MCG tablet Take 500 mcg by mouth      VITAMIN D 1000 UNIT OR CAPS 2 CAPSULES DAILY      CRANBERRY OR 1680 MG ORALLY DAILY       !! - Potential duplicate medications found. Please discuss with provider.        Allergies   Allergies   Allergen Reactions     Adhesive Tape Rash     Sulfa Drugs Rash

## 2021-08-07 ENCOUNTER — HOSPITAL ENCOUNTER (EMERGENCY)
Facility: CLINIC | Age: 81
Discharge: HOME | End: 2021-08-07
Attending: EMERGENCY MEDICINE | Admitting: EMERGENCY MEDICINE
Payer: MEDICARE

## 2021-08-07 VITALS
WEIGHT: 210 LBS | OXYGEN SATURATION: 97 % | BODY MASS INDEX: 38.41 KG/M2 | RESPIRATION RATE: 16 BRPM | HEART RATE: 106 BPM | DIASTOLIC BLOOD PRESSURE: 85 MMHG | TEMPERATURE: 97 F | SYSTOLIC BLOOD PRESSURE: 123 MMHG

## 2021-08-07 DIAGNOSIS — L03.313 CELLULITIS OF CHEST WALL: ICD-10-CM

## 2021-08-07 DIAGNOSIS — I48.0 PAROXYSMAL ATRIAL FIBRILLATION (H): ICD-10-CM

## 2021-08-07 LAB
ALBUMIN UR-MCNC: NEGATIVE MG/DL
ANION GAP SERPL CALCULATED.3IONS-SCNC: 7 MMOL/L (ref 3–14)
APPEARANCE UR: CLEAR
BASOPHILS # BLD AUTO: 0 10E3/UL (ref 0–0.2)
BASOPHILS NFR BLD AUTO: 1 %
BILIRUB UR QL STRIP: NEGATIVE
BUN SERPL-MCNC: 21 MG/DL (ref 7–30)
CALCIUM SERPL-MCNC: 8.9 MG/DL (ref 8.5–10.1)
CHLORIDE BLD-SCNC: 111 MMOL/L (ref 94–109)
CO2 SERPL-SCNC: 25 MMOL/L (ref 20–32)
COLOR UR AUTO: ABNORMAL
CREAT SERPL-MCNC: 1.01 MG/DL (ref 0.52–1.04)
EOSINOPHIL # BLD AUTO: 0.1 10E3/UL (ref 0–0.7)
EOSINOPHIL NFR BLD AUTO: 2 %
ERYTHROCYTE [DISTWIDTH] IN BLOOD BY AUTOMATED COUNT: 13.2 % (ref 10–15)
GFR SERPL CREATININE-BSD FRML MDRD: 52 ML/MIN/1.73M2
GLUCOSE BLD-MCNC: 146 MG/DL (ref 70–99)
GLUCOSE UR STRIP-MCNC: NEGATIVE MG/DL
HCT VFR BLD AUTO: 41.2 % (ref 35–47)
HGB BLD-MCNC: 13.9 G/DL (ref 11.7–15.7)
HGB UR QL STRIP: NEGATIVE
HOLD SPECIMEN: NORMAL
IMM GRANULOCYTES # BLD: 0 10E3/UL
IMM GRANULOCYTES NFR BLD: 0 %
KETONES UR STRIP-MCNC: 20 MG/DL
LEUKOCYTE ESTERASE UR QL STRIP: NEGATIVE
LYMPHOCYTES # BLD AUTO: 1.6 10E3/UL (ref 0.8–5.3)
LYMPHOCYTES NFR BLD AUTO: 25 %
MCH RBC QN AUTO: 30.5 PG (ref 26.5–33)
MCHC RBC AUTO-ENTMCNC: 33.7 G/DL (ref 31.5–36.5)
MCV RBC AUTO: 90 FL (ref 78–100)
MONOCYTES # BLD AUTO: 0.6 10E3/UL (ref 0–1.3)
MONOCYTES NFR BLD AUTO: 9 %
NEUTROPHILS # BLD AUTO: 4.2 10E3/UL (ref 1.6–8.3)
NEUTROPHILS NFR BLD AUTO: 63 %
NITRATE UR QL: NEGATIVE
NRBC # BLD AUTO: 0 10E3/UL
NRBC BLD AUTO-RTO: 0 /100
PH UR STRIP: 7 [PH] (ref 5–7)
PLATELET # BLD AUTO: 201 10E3/UL (ref 150–450)
POTASSIUM BLD-SCNC: 4.8 MMOL/L (ref 3.4–5.3)
RBC # BLD AUTO: 4.56 10E6/UL (ref 3.8–5.2)
SODIUM SERPL-SCNC: 143 MMOL/L (ref 133–144)
SP GR UR STRIP: 1.01 (ref 1–1.03)
TROPONIN I SERPL-MCNC: <0.015 UG/L (ref 0–0.04)
TSH SERPL DL<=0.005 MIU/L-ACNC: 1.23 MU/L (ref 0.4–4)
UROBILINOGEN UR STRIP-MCNC: NORMAL MG/DL
WBC # BLD AUTO: 6.7 10E3/UL (ref 4–11)

## 2021-08-07 PROCEDURE — 80048 BASIC METABOLIC PNL TOTAL CA: CPT | Performed by: EMERGENCY MEDICINE

## 2021-08-07 PROCEDURE — 36415 COLL VENOUS BLD VENIPUNCTURE: CPT | Performed by: EMERGENCY MEDICINE

## 2021-08-07 PROCEDURE — 93005 ELECTROCARDIOGRAM TRACING: CPT | Performed by: EMERGENCY MEDICINE

## 2021-08-07 PROCEDURE — 96365 THER/PROPH/DIAG IV INF INIT: CPT | Performed by: EMERGENCY MEDICINE

## 2021-08-07 PROCEDURE — 250N000013 HC RX MED GY IP 250 OP 250 PS 637: Performed by: EMERGENCY MEDICINE

## 2021-08-07 PROCEDURE — 99285 EMERGENCY DEPT VISIT HI MDM: CPT | Mod: 25 | Performed by: EMERGENCY MEDICINE

## 2021-08-07 PROCEDURE — 84443 ASSAY THYROID STIM HORMONE: CPT | Performed by: EMERGENCY MEDICINE

## 2021-08-07 PROCEDURE — 85025 COMPLETE CBC W/AUTO DIFF WBC: CPT | Performed by: EMERGENCY MEDICINE

## 2021-08-07 PROCEDURE — 93005 ELECTROCARDIOGRAM TRACING: CPT | Mod: 76 | Performed by: EMERGENCY MEDICINE

## 2021-08-07 PROCEDURE — 84484 ASSAY OF TROPONIN QUANT: CPT | Performed by: EMERGENCY MEDICINE

## 2021-08-07 PROCEDURE — 81003 URINALYSIS AUTO W/O SCOPE: CPT | Performed by: EMERGENCY MEDICINE

## 2021-08-07 PROCEDURE — 93010 ELECTROCARDIOGRAM REPORT: CPT | Performed by: EMERGENCY MEDICINE

## 2021-08-07 PROCEDURE — 250N000011 HC RX IP 250 OP 636: Performed by: EMERGENCY MEDICINE

## 2021-08-07 RX ORDER — METOPROLOL TARTRATE 1 MG/ML
5 INJECTION, SOLUTION INTRAVENOUS EVERY 5 MIN PRN
Status: DISCONTINUED | OUTPATIENT
Start: 2021-08-07 | End: 2021-08-07 | Stop reason: HOSPADM

## 2021-08-07 RX ORDER — METOPROLOL SUCCINATE 25 MG/1
25 TABLET, EXTENDED RELEASE ORAL ONCE
Status: COMPLETED | OUTPATIENT
Start: 2021-08-07 | End: 2021-08-07

## 2021-08-07 RX ORDER — CEPHALEXIN 500 MG/1
500 CAPSULE ORAL 4 TIMES DAILY
Qty: 28 CAPSULE | Refills: 0 | Status: SHIPPED | OUTPATIENT
Start: 2021-08-07 | End: 2021-08-14

## 2021-08-07 RX ORDER — MAGNESIUM SULFATE 1 G/100ML
1 INJECTION INTRAVENOUS ONCE
Status: COMPLETED | OUTPATIENT
Start: 2021-08-07 | End: 2021-08-07

## 2021-08-07 RX ADMIN — MAGNESIUM SULFATE 1 G: 1 INJECTION INTRAVENOUS at 13:51

## 2021-08-07 RX ADMIN — METOPROLOL SUCCINATE 25 MG: 25 TABLET, FILM COATED, EXTENDED RELEASE ORAL at 15:27

## 2021-08-07 NOTE — DISCHARGE INSTRUCTIONS
Antibiotics keep your follow-up appointment with cardiology as well as for your outpatient heart monitor.  If your heart rate Is persistently greater than 120 you have difficulty breathing, chest pain, chest pressure, or other new concerning symptoms, you should return to the emergency department immediately for further evaluation and treatment.    You do have a skin infection on her left breast.  Take cephalexin as prescribed.  Follow with your primary care provider this coming week to monitor response to antibiotic treatment.

## 2021-08-07 NOTE — ED PROVIDER NOTES
"  History     Chief Complaint   Patient presents with     Palpitations     HPI  Mercedes Gomez is a 80 year old female with history of paroxysmal atrial fibrillation on metoprolol and rivaroxaban, who presents emergency department for evaluations of rapid heart rate, chest pressure, dyspnea, stiffness and feeling tremulous.  Patient has known atrial fibrillation on rivaroxaban and metoprolol.  Most recent dose earlier this morning at 9:30 AM.  No fevers, chills, cough.  No \"chest pain\".  No dysuria, urgency, or frequency.  No abdominal pain.    Cellulitis under left breast      The patient's PMHx, Surgical Hx, Allergies, and Medications were all reviewed with the patient.    Allergies:  Allergies   Allergen Reactions     Adhesive Tape Rash     Sulfa Drugs Rash       Problem List:    Patient Active Problem List    Diagnosis Date Noted     Generalized muscle weakness 07/02/2021     Priority: Medium     Atrial fibrillation with rapid ventricular response (H) 07/02/2021     Priority: Medium     Presence of other cardiac implants and grafts 07/23/2020     Priority: Medium     Overactive bladder 04/28/2014     Priority: Medium     Anxiety 08/26/2013     Priority: Medium     Paroxysmal atrial fibrillation (H) 08/26/2013     Priority: Medium     Humerus fracture 09/17/2012     Priority: Medium     Proximal humeral fracture 09/17/2012     Priority: Medium     Insulin pump status 05/08/2009     Priority: Medium     Formatting of this note might be different from the original.  PLACED 4-       Type 1 diabetes mellitus without complication (H) 10/01/2008     Priority: Medium     Formatting of this note might be different from the original.  diagnosis 1991 with type 2; c-peptide low as of 2008  history of albuminuria       Hyperlipidemia due to type 1 diabetes mellitus (H) 09/25/2007     Priority: Medium        Past Medical History:    No past medical history on file.    Past Surgical History:    No past surgical " history on file.    Family History:    Family History   Problem Relation Age of Onset     Alzheimer Disease Mother        Social History:  Marital Status:   [2]  Social History     Tobacco Use     Smoking status: Never Smoker   Substance Use Topics     Alcohol use: No     Drug use: No        Medications:    cephALEXin (KEFLEX) 500 MG capsule  ACCU-CHEK ACTIVE  calcium carbonate 1250 (500 Ca) MG CHEW  Continuous Blood Gluc  (DEXCOM G6 ) FABIAN  Continuous Blood Gluc Sensor (DEXCOM G6 SENSOR) MISC  CRANBERRY OR  insulin aspart (NOVOLOG VIAL) 100 UNITS/ML vial  lisinopril (ZESTRIL) 5 MG tablet  metoprolol succinate ER (TOPROL-XL) 25 MG 24 hr tablet  NOVOLOG 100 UNIT/ML SC SOLN  pravastatin (PRAVACHOL) 40 MG tablet  rivaroxaban ANTICOAGULANT (XARELTO) 20 MG TABS tablet  SUBCUTANEOUS INF PUMP SUPPLIES  vitamin B-12 (CYANOCOBALAMIN) 500 MCG tablet  VITAMIN D 1000 UNIT OR CAPS          Review of Systems  Complete review of systems performed and otherwise negative except as mentioned in HPI      Physical Exam   BP: 126/72  Pulse: (!) 127  Temp: (!) 96.3  F (35.7  C)  Resp: 24  Weight: 95.3 kg (210 lb)  SpO2: 100 %    Physical Exam  GEN: Awake, alert, and cooperative.  Appears Annamarie distressed but nontoxic.  Resting in recumbent position on cart   HENT: MMM. External ears and nose normal bilaterally.  EYES: EOM intact. Conjunctiva clear. No discharge.   NECK: Symmetric, freely mobile.  No JVD  CV : Tachycardic rate.  Irregular regular rhythm.  Symmetric radial pulses.  PULM: Normal effort. No wheezes, rales, or rhonchi bilaterally.  ABD: Soft, non-tender, non-distended. No rebound or guarding.   NEURO: Normal speech. Following commands. CN II-XII grossly intact. Answering questions and interacting appropriately.   EXT: No gross deformity. Warm and well perfused.  INT: Erythematous area under left breast which is tender and warm to touch       ED Course        Procedures        EKG: Interpreted by myself:  Regular rate of 115 beats per minutes no definitive P waves preceding each QRS complex.  Right bundle branch block.  Poor R wave progression.  Prolongation of QT intervals of 450 ms.  No significant change compared to prior EKG.  Impression atrial fibrillation.     Critical Care time:  none               Results for orders placed or performed during the hospital encounter of 08/07/21 (from the past 24 hour(s))   CBC with Platelets & Differential    Narrative    The following orders were created for panel order CBC with Platelets & Differential.  Procedure                               Abnormality         Status                     ---------                               -----------         ------                     CBC with platelets and d...[[id number]]                      Final result                 Please view results for these tests on the individual orders.   Basic metabolic panel   Result Value Ref Range    Sodium 143 133 - 144 mmol/L    Potassium 4.8 3.4 - 5.3 mmol/L    Chloride 111 (H) 94 - 109 mmol/L    Carbon Dioxide (CO2) 25 20 - 32 mmol/L    Anion Gap 7 3 - 14 mmol/L    Urea Nitrogen 21 7 - 30 mg/dL    Creatinine 1.01 0.52 - 1.04 mg/dL    Calcium 8.9 8.5 - 10.1 mg/dL    Glucose 146 (H) 70 - 99 mg/dL    GFR Estimate 52 (L) >60 mL/min/1.73m2   Troponin I   Result Value Ref Range    Troponin I <0.015 0.000 - 0.045 ug/L   Patten Draw    Narrative    The following orders were created for panel order Patten Draw.  Procedure                               Abnormality         Status                     ---------                               -----------         ------                     Extra Blue Top Tube[[id number]]                              Final result               Extra Red Top Tube[[id number]]                               Final result               Extra Green Top (Lithium...[[id number]]                      Final result                 Please view results for these tests on the individual orders.    CBC with platelets and differential   Result Value Ref Range    WBC Count 6.7 4.0 - 11.0 10e3/uL    RBC Count 4.56 3.80 - 5.20 10e6/uL    Hemoglobin 13.9 11.7 - 15.7 g/dL    Hematocrit 41.2 35.0 - 47.0 %    MCV 90 78 - 100 fL    MCH 30.5 26.5 - 33.0 pg    MCHC 33.7 31.5 - 36.5 g/dL    RDW 13.2 10.0 - 15.0 %    Platelet Count 201 150 - 450 10e3/uL    % Neutrophils 63 %    % Lymphocytes 25 %    % Monocytes 9 %    % Eosinophils 2 %    % Basophils 1 %    % Immature Granulocytes 0 %    NRBCs per 100 WBC 0 <1 /100    Absolute Neutrophils 4.2 1.6 - 8.3 10e3/uL    Absolute Lymphocytes 1.6 0.8 - 5.3 10e3/uL    Absolute Monocytes 0.6 0.0 - 1.3 10e3/uL    Absolute Eosinophils 0.1 0.0 - 0.7 10e3/uL    Absolute Basophils 0.0 0.0 - 0.2 10e3/uL    Absolute Immature Granulocytes 0.0 <=0.0 10e3/uL    Absolute NRBCs 0.0 10e3/uL   Extra Blue Top Tube   Result Value Ref Range    Hold Specimen JIC    Extra Red Top Tube   Result Value Ref Range    Hold Specimen JIC    Extra Green Top (Lithium Heparin) Tube   Result Value Ref Range    Hold Specimen JIC    TSH with free T4 reflex   Result Value Ref Range    TSH 1.23 0.40 - 4.00 mU/L   UA reflex to Microscopic   Result Value Ref Range    Color Urine Straw Colorless, Straw, Light Yellow, Yellow    Appearance Urine Clear Clear    Glucose Urine Negative Negative mg/dL    Bilirubin Urine Negative Negative    Ketones Urine 20  (A) Negative mg/dL    Specific Gravity Urine 1.014 1.003 - 1.035    Blood Urine Negative Negative    pH Urine 7.0 5.0 - 7.0    Protein Albumin Urine Negative Negative mg/dL    Urobilinogen Urine Normal Normal, 2.0 mg/dL    Nitrite Urine Negative Negative    Leukocyte Esterase Urine Negative Negative    Narrative    Microscopic not indicated       Medications   magnesium sulfate 1 gm in 100mL D5W intermittent infusion (0 g Intravenous Stopped 8/7/21 1505)   metoprolol succinate ER (TOPROL-XL) 24 hr tablet 25 mg (25 mg Oral Given 8/7/21 1527)       Assessments & Plan (with  Medical Decision Making)   80 year old female with past medical history significant for atrial fibrillation on rivaroxaban and metoprolol presents emergency department with chest pressure, palpitations and dyspnea found to be in atrial fibrillation with rapid ventricular response.  Initial ECG not clearly atrial fibrillation.  Repeat ECG is more convincing.  Review of medical record shows that she had similar presentation previously and atrial fibrillation not obvious on one ECG and additional more supportive.  No signs of acute ischemia.  Cardiac troponin below detection.  CBC normal.  BMP grossly normal and urinalysis without evidence of acute infection.  Normal TSH.  Patient received 25 mg of metoprolol and 1 g of magnesium sulfate.    Her heart rate was still mildly tachycardic but less than 110 bpm.  This should continue to improve after her metoprolol dose in the ED.  She was found to have an area of cellulitis under her left breast.  Will treat with course of cephalexin.  Prescription sent to preferred pharmacy.  Plan for to follow with your primary care provider to monitor response to antibiotic.  ED return precautions discussed.  She expressed agreement understanding of plan and was discharged in improved condition.      I have reviewed the nursing notes.         Discharge Medication List as of 8/7/2021  3:22 PM      START taking these medications    Details   cephALEXin (KEFLEX) 500 MG capsule Take 1 capsule (500 mg) by mouth 4 times daily for 7 days, Disp-28 capsule, R-0, E-Prescribe             Final diagnoses:   Paroxysmal atrial fibrillation (H)   Cellulitis of chest wall     Ronny Mayfield MD    8/7/2021   [facility] EMERGENCY DEPT    Disclaimer: This note consists of words and symbols derived from keyboarding and dictation using voice recognition software.  As a result, there may be errors that have gone undetected.  Please consider this when interpreting information found in this  note.             Ronny Mayfield MD  08/16/21 0631

## 2021-08-07 NOTE — ED TRIAGE NOTES
Developed rapid heart rate, chest pressure, dyspnea, anxiety, tremors  Takes metoprolol for afib, last dose 0930  xarelto

## 2022-01-12 ENCOUNTER — HOSPITAL ENCOUNTER (EMERGENCY)
Facility: CLINIC | Age: 82
Discharge: LEFT BEFORE DISCHARGE | End: 2022-01-12
Payer: MEDICARE

## 2022-01-12 VITALS
RESPIRATION RATE: 18 BRPM | SYSTOLIC BLOOD PRESSURE: 148 MMHG | HEART RATE: 70 BPM | TEMPERATURE: 97.6 F | DIASTOLIC BLOOD PRESSURE: 67 MMHG | OXYGEN SATURATION: 96 %

## 2022-01-12 NOTE — ED TRIAGE NOTES
Patient fell on Sunday and hit her head.  Patient stated that she developed a headache yesterday, but headache gone now.  Patient denies loss of consciousness.  Patient is on Xarelto.  Patient forgot to take Xarelto yesterday.  Patient stated that she has been trying to get a hold of a triage nurse with [facility] for 3 days and finally got a call back today and was told to go to the ER.

## 2022-01-13 ENCOUNTER — APPOINTMENT (OUTPATIENT)
Dept: CT IMAGING | Facility: CLINIC | Age: 82
End: 2022-01-13
Attending: STUDENT IN AN ORGANIZED HEALTH CARE EDUCATION/TRAINING PROGRAM
Payer: MEDICARE

## 2022-01-13 ENCOUNTER — HOSPITAL ENCOUNTER (EMERGENCY)
Facility: CLINIC | Age: 82
Discharge: HOME | End: 2022-01-13
Attending: STUDENT IN AN ORGANIZED HEALTH CARE EDUCATION/TRAINING PROGRAM | Admitting: STUDENT IN AN ORGANIZED HEALTH CARE EDUCATION/TRAINING PROGRAM
Payer: MEDICARE

## 2022-01-13 VITALS
BODY MASS INDEX: 38.41 KG/M2 | RESPIRATION RATE: 16 BRPM | WEIGHT: 210 LBS | SYSTOLIC BLOOD PRESSURE: 147 MMHG | DIASTOLIC BLOOD PRESSURE: 59 MMHG | HEART RATE: 62 BPM | TEMPERATURE: 99.5 F | OXYGEN SATURATION: 97 %

## 2022-01-13 DIAGNOSIS — S09.90XA CLOSED HEAD INJURY, INITIAL ENCOUNTER: ICD-10-CM

## 2022-01-13 DIAGNOSIS — R51.9 HEADACHE, UNSPECIFIED HEADACHE TYPE: ICD-10-CM

## 2022-01-13 PROBLEM — E78.00 PURE HYPERCHOLESTEROLEMIA: Status: ACTIVE | Noted: 2022-01-13

## 2022-01-13 PROBLEM — E23.6 EMPTY SELLA (H): Status: ACTIVE | Noted: 2021-08-30

## 2022-01-13 PROBLEM — E04.2 MULTINODULAR GOITER: Status: ACTIVE | Noted: 2017-12-26

## 2022-01-13 PROBLEM — E06.3 HASHIMOTO'S THYROIDITIS: Status: ACTIVE | Noted: 2022-01-13

## 2022-01-13 PROBLEM — E66.01 SEVERE OBESITY (BMI 35.0-39.9) WITH COMORBIDITY (H): Status: ACTIVE | Noted: 2017-02-24

## 2022-01-13 PROBLEM — R43.9 DISTURBANCES OF SENSATION OF SMELL AND TASTE: Status: ACTIVE | Noted: 2022-01-13

## 2022-01-13 PROCEDURE — 72125 CT NECK SPINE W/O DYE: CPT | Mod: MG

## 2022-01-13 PROCEDURE — 72131 CT LUMBAR SPINE W/O DYE: CPT | Mod: MG

## 2022-01-13 PROCEDURE — 99285 EMERGENCY DEPT VISIT HI MDM: CPT | Mod: 25 | Performed by: STUDENT IN AN ORGANIZED HEALTH CARE EDUCATION/TRAINING PROGRAM

## 2022-01-13 PROCEDURE — 99284 EMERGENCY DEPT VISIT MOD MDM: CPT | Performed by: STUDENT IN AN ORGANIZED HEALTH CARE EDUCATION/TRAINING PROGRAM

## 2022-01-13 PROCEDURE — 70450 CT HEAD/BRAIN W/O DYE: CPT

## 2022-01-13 PROCEDURE — G1004 CDSM NDSC: HCPCS

## 2022-01-13 NOTE — ED PROVIDER NOTES
History     Chief Complaint   Patient presents with     Headache     fell sunday, on xarelto. vomited x 1. was here yesterday, did not wait to be seen. LWBS.     HPI  Mercedes Gomez is a 81 year old female with documented past medical history which includes atrial fibrillation on chronic anticoagulation therapy via Xarelto who presents to the department for evaluation of headache after fall on Sunday.  Patient explains that 5 days ago she was carrying an appliance in her house when she tripped over a stool causing her to twist and fall forward, the right side of her head struck a nearby antique ashtray.  She also landed on buttocks causing low back pain, however she attributes this to chronic low back pain as the location and sensation seem unchanged.  She reports that her  heard the incident and came to find her laying on the ground, no loss of consciousness or confusion thereafter.  The patient was helped upright and had some mild right knee pain which has since resolved.  She has had intermittent achy frontal head pains since the fall for which she has been taking acetaminophen for moderate relief.  Low back pain it is only intermittent and acetaminophen helps with that as well.  She specifically denies fever, chills, chest pain, shortness of breath, abdominal pain, leg weakness or sensory deficits.    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  Partial anosmia       Hashimoto's thyroiditis 01/13/2022     Priority: Medium     Pure hypercholesterolemia 01/13/2022     Priority: Medium     Empty sella (H) 08/30/2021     Priority: Medium     Generalized muscle weakness 07/02/2021     Priority: Medium     Atrial fibrillation with rapid  ventricular response (H) 07/02/2021     Priority: Medium     Presence of other cardiac implants and grafts 07/23/2020     Priority: Medium     Multinodular goiter 12/26/2017     Priority: Medium     Formatting of this note might be different from the original.  stable by ultrasound 11/17; recommend repeat ultrasound 11/19       Severe obesity (BMI 35.0-39.9) with comorbidity (H) 02/24/2017     Priority: Medium     Overactive bladder 04/28/2014     Priority: Medium     Osteopenia 10/22/2013     Priority: Medium     Anxiety 08/26/2013     Priority: Medium     Paroxysmal atrial fibrillation (H) 08/26/2013     Priority: Medium     Humerus fracture 09/17/2012     Priority: Medium     Proximal humeral fracture 09/17/2012     Priority: Medium     Knee pain, right 10/10/2011     Priority: Medium     Insulin pump status 05/08/2009     Priority: Medium     Formatting of this note might be different from the original.  PLACED 4-       Type 1 diabetes mellitus without complication (H) 10/01/2008     Priority: Medium     Formatting of this note might be different from the original.  diagnosis 1991 with type 2; c-peptide low as of 2008  history of albuminuria       Hyperlipidemia due to type 1 diabetes mellitus (H) 09/25/2007     Priority: Medium        Past Medical History:    No past medical history on file.    Past Surgical History:    No past surgical history on file.    Family History:    Family History   Problem Relation Age of Onset     Alzheimer Disease Mother        Social History:  Marital Status:   [2]  Social History     Tobacco Use     Smoking status: Never Smoker     Smokeless tobacco: Not on file   Substance Use Topics     Alcohol use: No     Drug use: No        Medications:    ACCU-CHEK ACTIVE  calcium carbonate 1250 (500 Ca) MG CHEW  Continuous Blood Gluc  (DEXCOM G6 ) FABIAN  Continuous Blood Gluc Sensor (DEXCOM G6 SENSOR) MISC  CRANBERRY OR  insulin aspart (NOVOLOG VIAL) 100 UNITS/ML  vial  lisinopril (ZESTRIL) 5 MG tablet  metoprolol succinate ER (TOPROL-XL) 25 MG 24 hr tablet  NOVOLOG 100 UNIT/ML SC SOLN  pravastatin (PRAVACHOL) 40 MG tablet  rivaroxaban ANTICOAGULANT (XARELTO) 20 MG TABS tablet  SUBCUTANEOUS INF PUMP SUPPLIES  vitamin B-12 (CYANOCOBALAMIN) 500 MCG tablet  VITAMIN D 1000 UNIT OR CAPS          Review of Systems  Constitutional:  Negative for fever or recent illness.  Cardiovascular:  Negative for chest discomfort.  Respiratory:  Negative for cough or shortness of breath.   Gastrointestinal:  Negative for abdominal pain, nausea or vomiting.   Musculoskeletal: Positive for midline lumbar back pain.  Negative for neck or thoracic back pain.  Right knee pain resolved.  Neurological: Positive for intermittent achy frontal headaches.  Negative for weakness or dizziness.    All others reviewed and are negative.      Physical Exam   BP: 115/63  Pulse: 66  Temp: 99.5  F (37.5  C)  Resp: 16  Weight: 95.3 kg (210 lb)  SpO2: 97 %      Physical Exam  Constitutional:  Well developed, well nourished.  Appears stable and in no acute distress.  Head:  Atraumatic appearance of face.  Negative for Raccoon eyes and Weinberg sign.  No tenderness of facial bones.  No tenderness of skull circumferentially.  Eye:  No proptosis or subconjunctival hemorrhage.  Eyelids appear symmetrical.  PERRL.  Oral:  Patient is without trismus or malocclusion.   Ears:  External auditory canals without blood or discharge, tympanic membranes without hemotympanum.  No mastoid region tenderness.  Neck:  No tenderness of midline cervical vertebra.  Ranging neck freely without being held in self-protecting position.    Cardiovascular:  No cyanosis.  Irregular rhythm.  No audible murmurs noted.    Respiratory/chest:  Effort normal, no respiratory distress.  CTAB without diminished lung sounds.  Gastrointestinal:  Soft nondistended abdomen.  No tenderness, guarding, rigidity, or rebound tenderness.  No palpable  organomegaly.  Musculoskeletal:  No extremity deformities.  No hip tenderness or pelvic instability.  No step-offs and no tenderness to palpation of midline thoracic or lumbosacral vertebra.  Moves extremities spontaneously and without complaint.  Hip flexion, knee extension, dorsiflexion and plantar flexion intact and equal bilaterally.    Neuro:  Patient is alert and verbally responsive.   Skin:  Skin is warm and dry, not diaphoretic.  No abrasions, contusions, ecchymosis, or lacerations.  Psych:  Normal mood and affect, not overly anxious or clinically intoxicated.    [place] Coma Scale - GCS (calculator)    Motor 6=Obeys commands   Verbal 5=Oriented   Eye Opening 4=Spontaneous   Total: 15       ED Course                 Procedures             Critical Care time:  none               Results for orders placed or performed during the hospital encounter of 01/13/22 (from the past 24 hour(s))   CT Head w/o Contrast    Narrative    CT OF THE HEAD WITHOUT CONTRAST 1/13/2022 2:30 PM     COMPARISON: Head CT 5/29/2005    HISTORY: Fall. Head trauma. Emesis. Headache.    TECHNIQUE: 5 mm thick axial CT images of the head were acquired  without IV contrast material.    FINDINGS: There is mild diffuse cerebral volume loss. There are subtle  patchy areas of decreased density in the cerebral white matter  bilaterally that are consistent with sequela of chronic small vessel  ischemic disease.    The ventricles and basal cisterns are within normal limits in  configuration given the degree of cerebral volume loss.  There is no  midline shift. There are no extra-axial fluid collections.    No intracranial hemorrhage, mass or recent infarct.    The visualized paranasal sinuses are well-aerated. There is no  mastoiditis. There are no fractures of the visualized bones.      Impression    IMPRESSION: Diffuse cerebral volume loss and cerebral white matter  changes consistent with chronic small vessel ischemic disease. No  evidence for  acute intracranial pathology.        Radiation dose for this scan was reduced using automated exposure  control, adjustment of the mA and/or kV according to patient size, or  iterative reconstruction technique    ARMAND MOORE MD         SYSTEM ID:  [id number]   CT Cervical Spine w/o Contrast    Narrative    CT OF THE CERVICAL SPINE WITHOUT CONTRAST   1/13/2022 2:32 PM     COMPARISON: None    HISTORY: Fall. Trauma. Pain.     TECHNIQUE: Axial images of the cervical spine were acquired without  intravenous contrast. Multiplanar reformations were created.        Impression    IMPRESSION: There is normal alignment of the cervical vertebrae.  Vertebral body heights of the cervical spine are normal.  Craniocervical alignment is normal. There are no fractures of the  cervical spine.  Minimal posterior disc bulge at the C3-C4 level. Loss  of disc space height and degenerative endplate spurring at C4-C5,  C5-C6 and C6-C7. Mild facet arthropathy throughout the cervical spine.  No significant degenerative spinal canal stenosis of the cervical  spine. No prevertebral soft tissue swelling.      Radiation dose for this scan was reduced using automated exposure  control, adjustment of the mA and/or kV according to patient size, or  iterative reconstruction technique.    ARMAND MOORE MD         SYSTEM ID:  [id number]   Lumbar spine CT w/o contrast    Narrative    CT OF THE LUMBAR SPINE WITHOUT CONTRAST  1/13/2022 2:32 PM     COMPARISON: None.    HISTORY: Fall. Trauma. Back pain.     TECHNIQUE: Axial images of the lumbar spine were acquired without  intravenous contrast. Multiplanar reformations were created from the  axial source images.        Impression    IMPRESSION:  Five lumbar type vertebrae. Grade 1 degenerative  anterolisthesis of L4 upon L5. Alignment otherwise normal. Vertebral  body heights normal. No fractures. Posterior broad-based disc bulges  at L3-L4, L4-L5 and L5-S1 levels. Moderate facet arthropathy  bilaterally  at L4-L5 and L5-S1. Moderate degenerative spinal canal  stenosis at L4-L5. Moderate degenerative neural foraminal stenosis on  the left at L3-L4 and on the right at L4-L5. No other significant  spinal canal or neural foraminal stenosis of the lumbar spine.      Radiation dose for this scan was reduced using automated exposure  control, adjustment of the mA and/or kV according to patient size, or  iterative reconstruction technique.    ARMAND MOORE MD         SYSTEM ID:  [id number]       Medications - No data to display    Assessments & Plan (with Medical Decision Making)   Mercedes Gomez is a 81 year old female who presents to the department for evaluation of headache since fall 5 days ago in which she struck her head and suffered back and knee injuries.  She has been ambulatory without any significant knee pain recently and has declined imaging for that part of her body.  CT imaging of head/brain read by radiologist as having no sign of acute intracranial process, however given her symptoms she likely suffered from concussion.  No identifiable fracture or subluxation of cervical or lumbar vertebra.  Patient is reassured with these findings, although concussions can have long lasting effects and recommend to follow-up with primary care provider over the next 1 week for reevaluation.  Concussion referral order also placed in the department.        Disclaimer:  This note consists of symbols derived from keyboarding, dictation, and/or voice recognition software.  As a result, there may be errors in the script that have gone undetected.  Please consider this when interpreting information found in the chart.        I have reviewed the nursing notes.    I have reviewed the findings, diagnosis, plan and need for follow up with the patient.       Discharge Medication List as of 1/13/2022  4:13 PM          Final diagnoses:   Closed head injury, initial encounter   Headache, unspecified headache type       1/13/2022    [facility] EMERGENCY DEPT     Winston Calderon DO  01/13/22 1835

## 2022-03-28 ENCOUNTER — VIRTUAL VISIT (OUTPATIENT)
Dept: NEUROLOGY | Facility: CLINIC | Age: 82
End: 2022-03-28
Payer: MEDICARE

## 2022-03-28 DIAGNOSIS — F07.81 POST CONCUSSION SYNDROME: Primary | ICD-10-CM

## 2022-03-28 DIAGNOSIS — G31.84 MCI (MILD COGNITIVE IMPAIRMENT): ICD-10-CM

## 2022-03-28 PROCEDURE — 99205 OFFICE O/P NEW HI 60 MIN: CPT | Mod: 95 | Performed by: NURSE PRACTITIONER

## 2022-03-28 NOTE — LETTER
"    3/28/2022         RE: Mercedes Gomez  [address] [facility] [address]        Dear Colleague,    Thank you for referring your patient, Mercedes Gomez, to the [facility]. Please see a copy of my visit note below.    Video Visit  Mercedes Gomez is a 81 year old female who is being evaluated via a billable video visit in light of the ongoing global health crisis (COVID-19) that requires us to abide by social distancing mandates in order to reduce the risk of COVID-19 exposure.      The patient has been notified of following:     \"This virtual visit will be conducted via a video call between you and your physician/provider. We have found that certain health care needs can be provided without the need for a physical exam.  This service lets us provide the care you need with a short video conversation.  If a prescription is necessary we can send it directly to your pharmacy.  If lab work is needed we can place an order for that and you can then stop by our lab to have the test done at a later time.    If during the course of the call the physician/provider feels a video visit is not appropriate, you will not be charged for this service.\"     Patient has given verbal consent to a Video visit? Yes    Mercedes Gomez chief complaint is: Post Concussion Syndrome      Current PT  No   Current OT   No   Current ST      No   Current Chiropractic   No   Psychiatrist currently  No   Past:   No   Psychologist currently  No   Past:   No   Primary: Currently    Yes                Need a note for work accommodations   N/A   Need a note for school accommodations    N/A        Medications  Currently on medication to help you sleep   No    Mental health dx.- No   Currently on medication to help with mental health No       Currently on medication for concentration or ADD /ADHD      No      Date of accident: 1/8/22    Workman's Comp  No     QRC   N/A        How " concussion happened:     Pt tripped over a stool while carrying an appliance into her house.        LOC:  No      Did you seek medical attention:  Yes    When :  1/13/22     MRI/CT Completed Yes       Injury Description:               Was there a forcible blow to the head?:                Yes      Where on head? Right side                                             Retrograde Amnesia (loss of memory of events before the injury)?:  No   Anterograde Amnesia (loss of memory of events following injury)?: No     Number of previous head injuries.        1  When she was 14     Had all previous concussion symptoms resolved   Yes     Work/School  Currently employed    No    Retired    Yes   How many years ago/Previous occupation: 29 years    Patient History  Patient was referred to the concussion clinic by [facility].     Phone Start Time: 9:05 am    Phone End Time:  9:20 am    Total time of phone call 15 minutes    Mode of Communication: Video Conference via [employer]  Telephone number: [phone]    Joey Hurley LAT ATC    Plan:     Neuropsychological assessment   No, already completed through another facility, will send results to my office  PT to evaluate and treat  No   OT to evaluate and treat  Yes   ST to evaluate and treat  No   Safe and Sound eval and treat   No   Referral to ophthalmology   No   Referral to Neurology        No   Referral to psychology No   Referral to psychiatry  No   Other Referral   No   MRI/CT ordered today : No   Labs ordered today : Yes, vitamin B12  New medication :  No     Work note completed : N/A   School note completed : N/A   [facility] list sent : N/A     We discussed some treatment options and have elected to   have patient do occupational therapy to help with cognition, will also check vitamin B12 levels, patient will send me her neuropsych results after she receives them.    Medication Adjustment:  No medication changes    Return to Work/School   Full  scheduled hours    no, patient is retired     Return to clinic 10 weeks    Continue with the support of the clinic, reassurance, and redirection. Staff monitoring and ongoing assessments per team plan. This team will utilize appropriate emergency services if necessary. I will make myself available if concerns or problems arise.  The patient agrees to call/message before her next visit with any questions, concerns or problems.    Subjective:          HPI Per Pt EMR: Mercedes Gomez is a 81 year old female with documented past medical history which includes atrial fibrillation on chronic anticoagulation therapy via Xarelto who presents to the department for evaluation of headache after fall on Sunday.  Patient explains that 5 days ago she was carrying an appliance in her house when she tripped over a stool causing her to twist and fall forward, the right side of her head struck a nearby antique ashtray.  She also landed on buttocks causing low back pain, however she attributes this to chronic low back pain as the location and sensation seem unchanged.  She reports that her  heard the incident and came to find her laying on the ground, no loss of consciousness or confusion thereafter.  The patient was helped upright and had some mild right knee pain which has since resolved.  She has had intermittent achy frontal head pains since the fall for which she has been taking acetaminophen for moderate relief.  Low back pain it is only intermittent and acetaminophen helps with that as well.  She specifically denies fever, chills, chest pain, shortness of breath, abdominal pain, leg weakness or sensory deficits.      Headaches:  Significant ongoing headaches No   Headaches: Resolved  Improvement :Yes   Current Headache No   Wake with HA  No     Worse Headache    0/10           How often: Pt no longer having headaches    Average Headache 0/10.    Best Headache 0/10.  Brings on HA/Makes symptoms worse:   N/a  Makes  symptoms better. N/a  Taking  None        Helpful:  N/A       Physical Symptoms:  Headache-No     Resolved Yes           Improved since accident Improved     Nausea- No    Resolved Yes        Improved since accident    Yes     Vomiting - No      Resolved Yes        Improved since accident Same     Balance problems - No        Dizziness - No    Visual problems - No     Fatigue - No     Resolved Yes         Improved since accident Improved    Sensitivity to light - No      Sensitivity to sound - No     Numbness/tingling -No        Cognitive Symptoms  Feeling mentally foggy - No        Resolved Yes      Improved since accident Improved    Feeling slowed down - No       Resolved Yes        Improved since accident Improved    Difficulty Concentrating- No       Resolved  Yes    Improved since accident Improved    Difficulty remembering - No       Resolved Yes       Improved since accident Improved      Emotional Symptoms  Irritability - No        Resolved Yes       Improved since accident Improved    Sadness-   No       Resolved N/A       Improved since accident Same    More emotional - No      Resolved N/A       Improved since accident Same    Nervousness/anxiety - No      Resolved N/A         Improved since accident Same      Psychiatric History:  Anxiety -No   Depression -Yes   Other mental health dx:  No     Sleep Disorders - No   The patient denies being a victim of abuse.   Ever Hospitalized for mental health:            No   Any thought of hurting self or others now?   No   Any history of hurting self or others?            No     Sleep History:  Drowsiness- No        Resolved Yes       Improved since accident Improved    Sleep less than usual - No   Sleep more than usual - No   Trouble falling asleep - No     Resolved N/A        Improved since accident Same    Does the patient wake feeling rested - Yes        Resolved N/A          Improved since accident Same       Migraine Headaches      Patient history of  migraines.   No       Family history of migraines    No     Exertion:         Do the above stated symptoms worsen with physical activity? No         Do the above stated symptoms worsen with cognitive activity? No             The following portions of the patient's history were reviewed and updated as appropriate: allergies, current medications, past family history, past medical history, past social history, past surgical history and problem list.    Review of Systems  A comprehensive review of systems was negative except for what is noted above.    Objective:       Discussion was held with the patient today regarding concussion in general including types of injury, symptoms that are common, treatment and variability in time to recover. Education about concussion symptoms and length of time it would take the patient to recover was also given to the patient.  I have reassured the patient her symptoms are very common when a concussion is present and will improve with time. We discussed the risks and benefits of the medication including risk of worsening depression with medication adjustments and even the possibility of emergence of suicidal ideations.       Total time spent with the patient today was 60 minutes with greater than 50% of the time spent in counseling and care coordination. The patient will call before then with any questions, concerns or problems.The patient will seek out appropriate emergency services should that become necessary.    Physical Exam:   Neck:  Full ROM  Yes  with pain or stiffness No     Neurologic:   Mental status: Alert, oriented, thought content appropriate.. Recent and remote memory grossly intact.  Yes  Speech is clear and fluent with no obvious word finding or paraphasic errors. Yes     Assessment/Diagnosis managed and treated at today's visit :  Post concussion syndrome  Post concussion headache  Nausea  Dizziness  Fatigue  Insomnia  Sensitivity to light  Sound  sensitivity  Concentration and Attention deficit  Memory difficulties  Anxiety d/t a medical condition  Irritability      Other:   Patient agrees to call or return sooner with any questions or concerns.  Risks and benefits were discussed.      Continue with the support of the clinic, reassurance, and redirection. Staff monitoring and ongoing assessments per team plan.This team will utilize appropriate emergency services if necessary. I will make myself available if concerns or problems arise.     Mental Status Examination  Alertness:  alert  and oriented  Appearance:  well groomed  Behavior/Demeanor:  cooperative, pleasant and calm, with good  eye contact.  Speech:  normal  Psychomotor:  normal or unremarkable    Mood:  good  Affect:  appropriate and was congruent to speech content.  Thought Process/Associations: unremarkable   Thought Content: devoid of  suicidal and violent ideation and delusions.   Perception: devoid of  hallucinations  Insight:  good.  Judgment: good.  Attention/Concentration:  Normal  Language:  Intact  Fund of Knowledge:  Average.    Memory:  Immediate recall intact, Short-term memory intact and Long-term memory intact.      Consent was obtained for this service by one of our care team members    Video Visit Details    Type of service: Video Visit    Video Start Time:  0940    Video End Time:  1020    Total time of video visit: 40 minutes    20 minutes spent on the date of the encounter doing chart review, review of outside records, review of test results, interpretation of tests and documentation    Originating Location: Patient's home    Distant Location:  [facility]    Mode of Communication: Video Conference via [employer]    Patient Instructions   It was nice speaking with you today for our office visit held through a virtual visit. The following is a summary of our visit and my recommendations:    How to return to daily activities with concussion:  1. Get  "lots of rest. Be sure to get enough sleep at night- no late nights. Keep the same bedtime weekdays and weekends.   2. Take daytime naps or rest breaks when you feel tired or fatigued.  3. Limit physical activity as well as activities that require a lot of thinking or concentration. These activities can make symptoms worse and recovery time longer. In some cases, your doctor may prescribe time that you completely eliminate these activities to allow complete \"brain rest.\"  Physical activity includes going to the gym, sports practices, weight-training, running, exercising, heavy lifting, etc.  Thinking and concentration activities (e.g., cell phone texting, computer games, movies, parties, loud music and in severe cases may include limiting your time at work).  4. Drink lots of fluids and eat carbohydrates or protein to main appropriate blood sugar levels.  5. As symptoms decrease, with consent from your doctor, you may begin to gradually return to your daily activities. If symptoms worsen or return, lessen your activities, then try again to increase your activities gradually.   6. During recovery, it is normal to feel frustrated and sad when you do not feel right and you can't be as active as usual.  7. Repeated evaluation of your symptoms is recommended to help guide recovery. Please follow up as recommended by your doctor to ensure a safe and healthy recovery.    Watch for and go to the Emergency Department if you have any of the following symptoms:  Headaches that significantly worsen  Looks very drowsy or can't be awakened  Can't recognize people or places  Worsening neck pain  Seizures  Repeated vomiting  Increasing confusion or irritability  Unusual behavioral change  Slurred speech  Weakness or numbness in arms/legs  Change in state of consciousness    For more information, please visit on the Internet:  http://www.cdc.gov/concussion/get_help.html "   http://www.cdc.gov/concussion/pdf/Facts_about_Concussion_TBI-a.pdf      General Information:  Today you had your appointment with Christal Casas CNP     If lab work was done today as part of your evaluation you will generally be contacted via My Chart, mail, or phone with the results within 1-5 days. If there is an alarming result we will contact you by phone. Lab results come back at varying times, I generally wait until all labs are resulted before making comments on results. Please note labs are automatically released to My Chart once available.     If you need refills please contact your pharmacist. They will send a refill request to me to review. Please allow 3 business days for us to process all refill requests.     Please call or send a medical message through My Chart, with any questions or concerns.    If you need any paperwork completed please fax forms to [phone]. Please state if you would like a copy of the completed paperwork, mailed or faxed back to the patient and a fax number to fax the paperwork to. Please allow up to 10 business days for paperwork to be completed.    Christal Casas CNP          Again, thank you for allowing me to participate in the care of your patient.        Sincerely,        ANDREA Martell CNP
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
## 2022-04-11 ENCOUNTER — HOSPITAL ENCOUNTER (OUTPATIENT)
Dept: OCCUPATIONAL THERAPY | Facility: CLINIC | Age: 82
Setting detail: THERAPIES SERIES
Discharge: HOME | End: 2022-04-11
Attending: NURSE PRACTITIONER
Payer: MEDICARE

## 2022-04-11 PROCEDURE — 97165 OT EVAL LOW COMPLEX 30 MIN: CPT | Mod: GO | Performed by: OCCUPATIONAL THERAPIST

## 2022-04-11 NOTE — PROGRESS NOTES
04/11/22 1300   Quick Adds   Quick Adds Concussion   Type of Visit Initial Outpatient Occupational Therapy Evaluation       Present No   General Information   Start Of Care Date 04/11/22   Referring Physician ANDREA Martell CNP   Orders Evaluate and treat as indicated   Other Orders Cognitive Therapy   Orders Date 03/28/22   Medical Diagnosis Post Concussive Syndrome and MCI   Onset of Illness/Injury or Date of Surgery 01/13/22   Surgical/Medical History Reviewed Yes   Additional Occupational Profile Info/Pertinent History of Current Problem Olga Lidia is an 81 year old female who tripped over a stool striking the right side of her head resulting in concussion like symptoms.  She reports she had felt as if she returned back to normal, however noting that yesterday she had an episode where she couldn't understand what she was reading and had a mild headache in the front of her head.  She went to sleep and woke this morning with symptoms resolved.  She was encouraged to be seen in ER, however states she is following up with her doctor tomorrow.  She has a history of A-Fib and is on a blood thinner.  She also is diabetic and monitors her blood sugars.   Role/Living Environment   Current Community Support Family/friend caregiver   Patient role/Employment history Retired   Prior Level - Transfers Independent   Prior Level - Ambulation Independent   Prior Level - ADLS Independent   Prior Responsibilities - IADL Meal Preparation;Housekeeping;Laundry;Shopping;Medication management;Finances;Driving   Role/Living Environment Comments She teaches a art class 1x week, loves to read, does quilting, goes to card club   Patient/family Goals Statement She reports she is here because the doctor said she should come   Vision Interview   Technology Used cell phone and computer   Technology Use Increases Symptoms No   Do Glasses Help? Yes   Type of Glasses Progressive   Light Sensitivity/Glare Comments no  sensitivity to light   Reported Reading Speed Baseline   Reading Endurance/Fatigue   Complaints of Visual Fatigue Comments no   Convergence Normal   Pursuits Normal   Pupillary Function Normal   Difficulties with IADL Performance: Increase in Symptoms with the Following   Difficulty Concentrating at School, Work or Home no   Mood Changes   Patient Mood Comments No changes in mood   Is Patient Currently Receiving Treatment for Mental Health? No   Vestibular Symptoms   Is Patient Experiencing Vestibular Symptoms? No   Fatigue   General Fatigue Comments Reporting no concerns with fatigue today   Pain   Patient currently in pain No   Fall Risk Screen   Fall screen completed by OT   Have you fallen 2 or more times in the past year? No   Have you fallen and had an injury in the past year? No   Fall screen comments Did trip over stool when she fell and recieved concussion   Abuse Screen (yes response referral indicated)   Feels Unsafe at Home or Work/School no   Feels Threatened by Someone no   Does Anyone Try to Keep You From Having Contact with Others or Doing Things Outside Your Home? no   Physical Signs of Abuse Present no   Cognitive Status Examination   Orientation Orientation to person, place and time   Level of Consciousness Alert   Follows Commands and Answers Questions 100% of the time;Able to follow multistep instructions   Personal Safety and Judgment Intact   Memory Intact   Cognitive Comment SDMT (Symbol Digit Modalities Test): -1.0SD below the norm at 25pts.  Cognistat:  see additional write up   Visual Perception   Visual Perception Wears glasses   Visual Field WNL   Visual Attention WNL   Oculomotor WNL   Sensation   Upper Extremity Sensory Examination No deficits were identified   Hand Strength   Hand Dominance Right   Muscle Tone   Muscle Tone No deficits were identified   Functional Mobility   Ambulation ambulatory, no device   Transfer Skill   Level of [place]: Transfers independent   Bathing    Level of [place] - Bathing independent   Upper Body Dressing   Level of [place]: Dress Upper Body independent   Lower Body Dressing   Level of [place]: Dress Lower Body independent   Toileting   Level of [place]: Toilet independent   Grooming   Level of [place]: Grooming independent   Eating/Self-Feeding   Level of [place]: Eating independent   Activity Tolerance   Activity Tolerance does daily exericses with knees   Clinical Impression   Criteria for Skilled Therapeutic Interventions Met Evaluation only   OT Diagnosis S/P Concussion   Clinical Decision Making (Complexity) Low complexity   Risks and Benefits of Treatment have been explained. Yes   Patient, Family & other staff in agreement with plan of care Yes   Clinical Impression Comments Olga Lidia is a pleasant lady with resolving concussion symptoms.  Recent change in status (inability to read with mild frontal lobe headache) with resolution.  She is encouraged to go to ER, however is seeing primary caregiver tomorrow.  No further OT is recommended   Education Assessment   Barriers To Learning No Barriers   Total Evaluation Time   OT Eval, Low Complexity Minutes (07070) 30     Cognistat    SUMMARY OF TEST:    The Cognistat is designed to assess functioning in five major areas:  Language, Constructions, Memory, Calculations and Reasoning.  Attention, Level of Consciousness and Orientation are assessed independently.  Scores are plotted on a profile which illustrates the overall pattern of abilities and disabilities.  Scores can be compared to norms and/or representative profiles for various populations.    DATE OF TESTIN2022    RESULTS OF TESTING:    This patient scores in the average range for Level of Consciousness, Orientation, Attention, Language, Constructions, Memory, and Reasoning    This patient scores in the mildly impaired for  n/a    This patient scores in the moderately impaired for  n/a    This patient scores in  the severely impaired for  n/a    Test results comments:  Mercedes reports mental math is not a skills she has.    INTERPRETATION OF TEST RESULTS:    Overall Olga Lidia completes all areas in the average range    Factors affecting performance:  No additional problems noted    Recommendations: To continue with the cognitive activity that she currently is doing:  reading, cards, quilting, teaching    TIME ADMINISTERING TEST: 10    TIME FOR INTERPRETATION AND PREPARATION OF REPORT: 5    TOTAL TIME: 15

## 2022-05-08 ENCOUNTER — HOSPITAL ENCOUNTER (EMERGENCY)
Facility: CLINIC | Age: 82
Discharge: HOME | End: 2022-05-08
Attending: PHYSICIAN ASSISTANT | Admitting: PHYSICIAN ASSISTANT
Payer: MEDICARE

## 2022-05-08 VITALS
TEMPERATURE: 97.6 F | HEART RATE: 69 BPM | BODY MASS INDEX: 38.64 KG/M2 | WEIGHT: 210 LBS | RESPIRATION RATE: 16 BRPM | HEIGHT: 62 IN | OXYGEN SATURATION: 98 % | DIASTOLIC BLOOD PRESSURE: 50 MMHG | SYSTOLIC BLOOD PRESSURE: 172 MMHG

## 2022-05-08 DIAGNOSIS — L03.116 CELLULITIS OF LEFT FOOT: ICD-10-CM

## 2022-05-08 PROCEDURE — G0463 HOSPITAL OUTPT CLINIC VISIT: HCPCS | Performed by: PHYSICIAN ASSISTANT

## 2022-05-08 PROCEDURE — 99213 OFFICE O/P EST LOW 20 MIN: CPT | Performed by: PHYSICIAN ASSISTANT

## 2022-05-08 RX ORDER — CEPHALEXIN 500 MG/1
500 CAPSULE ORAL 4 TIMES DAILY
Qty: 28 CAPSULE | Refills: 0 | Status: SHIPPED | OUTPATIENT
Start: 2022-05-08 | End: 2022-05-15

## 2022-05-08 ASSESSMENT — ENCOUNTER SYMPTOMS
CONSTITUTIONAL NEGATIVE: 1
ARTHRALGIAS: 1
NEUROLOGICAL NEGATIVE: 1
COLOR CHANGE: 1
WOUND: 1

## 2022-05-08 NOTE — ED PROVIDER NOTES
History     Chief Complaint   Patient presents with     Foot Pain     HPI  Mercedes Gomez is a 81 year old female with a past medical history of type 1 diabetes, paroxysmal atrial fibrillation (on Xarelto), hypothyroidism, and hyperlipidemia who presents with left foot pain which began 1 week ago.  States that she dropped a marble spoon rest on top of her left foot accidentally last week which caused a small cut and some pain and bruising.  She has been able to ambulate without difficulty but continues to have pain in the dorsal aspect of the left foot over the past week.  She has also had worsening redness and swelling on the dorsal aspect of the left foot.  No fevers, no chest pain or shortness of breath, no difficulties with breathing or swallowing.  She has no redness or swelling in the lower extremities.    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  stable by ultrasound 11/17; recommend repeat ultrasound 11/19       Severe obesity (BMI 35.0-39.9) with comorbidity (H) 02/24/2017     Priority: Medium     Overactive bladder 04/28/2014     Priority: Medium     Osteopenia 10/22/2013     Priority: Medium      Anxiety 08/26/2013     Priority: Medium     Paroxysmal atrial fibrillation (H) 08/26/2013     Priority: Medium     Humerus fracture 09/17/2012     Priority: Medium     Proximal humeral fracture 09/17/2012     Priority: Medium     Knee pain, right 10/10/2011     Priority: Medium     Insulin pump status 05/08/2009     Priority: Medium     Formatting of this note might be different from the original.  PLACED 4-       Type 1 diabetes mellitus without complication (H) 10/01/2008     Priority: Medium     Formatting of this note might be different from the original.  diagnosis 1991 with type 2; c-peptide low as of 2008  history of albuminuria       Hyperlipidemia due to type 1 diabetes mellitus (H) 09/25/2007     Priority: Medium        Past Medical History:    No past medical history on file.    Past Surgical History:    No past surgical history on file.    Family History:    Family History   Problem Relation Age of Onset     Alzheimer Disease Mother        Social History:  Marital Status:   [2]  Social History     Tobacco Use     Smoking status: Never Smoker   Substance Use Topics     Alcohol use: No     Drug use: No        Medications:    aspirin (ASA) 81 MG EC tablet  cephALEXin (KEFLEX) 500 MG capsule  ACCU-CHEK ACTIVE  calcium carbonate 1250 (500 Ca) MG CHEW  Continuous Blood Gluc  (DEXCOM G6 ) FABIAN  Continuous Blood Gluc Sensor (DEXCOM G6 SENSOR) MISC  CRANBERRY OR  insulin aspart (NOVOLOG VIAL) 100 UNITS/ML vial  lisinopril (ZESTRIL) 5 MG tablet  metoprolol succinate ER (TOPROL-XL) 25 MG 24 hr tablet  NOVOLOG 100 UNIT/ML SC SOLN  pravastatin (PRAVACHOL) 40 MG tablet  rivaroxaban ANTICOAGULANT (XARELTO) 20 MG TABS tablet  SUBCUTANEOUS INF PUMP SUPPLIES  vitamin B-12 (CYANOCOBALAMIN) 500 MCG tablet  VITAMIN D 1000 UNIT OR CAPS      Review of Systems   Constitutional: Negative.    Musculoskeletal: Positive for arthralgias.   Skin: Positive for color change and wound.   Neurological:  "Negative.        Physical Exam   BP: (!) 227/84  Pulse: 69  Temp: 97.6  F (36.4  C)  Resp: 16  Height: 157.5 cm (5' 2\")  Weight: 95.3 kg (210 lb)  SpO2: 98 %      Physical Exam  Constitutional:       General: She is not in acute distress.     Appearance: Normal appearance. She is normal weight.   HENT:      Head: Normocephalic and atraumatic.   Cardiovascular:      Rate and Rhythm: Normal rate.      Pulses: Normal pulses.           Dorsalis pedis pulses are 2+ on the left side.   Musculoskeletal:         General: No swelling or tenderness.      Cervical back: Normal range of motion and neck supple. No rigidity or tenderness. No muscular tenderness.   Skin:     General: Skin is warm.      Capillary Refill: Capillary refill takes less than 2 seconds.      Coloration: Skin is not jaundiced or pale.      Findings: Erythema and lesion present. No bruising or rash.             Comments: 2 small lesions on the dorsal aspect of the left foot, both are covered with scabs.  No fluctuant masses or purulent drainage, no deformities.  There is surrounding erythema at the wound site.   Neurological:      General: No focal deficit present.      Mental Status: She is alert and oriented to person, place, and time.      Sensory: No sensory deficit.         ED Course                 Procedures                No results found for this or any previous visit (from the past 24 hour(s)).    Medications - No data to display    Assessments & Plan (with Medical Decision Making)     Presentation and physical exam are consistent with cellulitis of the left foot.  Starting the patient on oral Keflex for 7 days.     The patient does not meet [place] foot and ankle rules, imaging not indicated today.    Recommend urgent medical evaluation if the patient develops fevers of 102  F or greater, worsening pain, pain out of proportion to injury, numbness or tingling or loss of feeling, worsening redness/tenderness/swelling in the left foot or red streaks " progressing up into the left lower leg.    Blood pressure was elevated during today's visit, came down to 172/50 after the patient had a chance to rest quietly.  The patient denies symptoms of chest pain, shortness of breath, palpitations, dizziness, lightheadedness, headache, or neurologic dysfunction.  No numbness or tingling or weakness in the face, neck or extremities.  She has had a stroke in the past, presenting symptoms were slight headache and slightly altered mentation.  She denies having these symptoms today.  Patient was stable at the time of discharge.    Recommend checking blood pressure later today and every other day for the next 2 weeks.  If elevated over 160/100 consistently, recommend follow-up in clinic within the next 2 days.  Recommend presenting to the emergency room if your blood pressure remains greater than 180/110 or if you develop symptoms consistent with stroke or heart attack.    You should see improvement in symptoms in 24 to 48 hours after starting antibiotic. Return to clinic if symptoms worsen or persist for more than 48 hours.     Recommend taking a probiotic at the same time you are on antibiotic. Probiotic supports and maintains digestive health while taking antibiotic.    I have reviewed the nursing notes.    I have reviewed the findings, diagnosis, plan and need for follow up with the patient.    Discharge Medication List as of 5/8/2022  2:50 PM      START taking these medications    Details   cephALEXin (KEFLEX) 500 MG capsule Take 1 capsule (500 mg) by mouth 4 times daily for 7 days, Disp-28 capsule, R-0, E-Prescribe             Final diagnoses:   Cellulitis of left foot       5/8/2022   [facility] EMERGENCY DEPT     Marcio Santos PA-C  05/11/22 [id number]

## 2022-05-08 NOTE — ED TRIAGE NOTES
"Pt dropped a marble spoon rest on her L foot on Monday. Increased pain and \"it looks angry now\".      Triage Assessment     Row Name 05/08/22 [id number]       Triage Assessment (Adult)    Airway WDL WDL       Respiratory WDL    Respiratory WDL WDL       Skin Circulation/Temperature WDL    Skin Circulation/Temperature WDL WDL       Cardiac WDL    Cardiac WDL WDL       Peripheral/Neurovascular WDL    Peripheral Neurovascular WDL WDL       Cognitive/Neuro/Behavioral WDL    Cognitive/Neuro/Behavioral WDL WDL              "

## 2022-05-08 NOTE — DISCHARGE INSTRUCTIONS
Recommend urgent medical evaluation if the patient develops fevers of 102  F or greater, worsening pain, pain out of proportion to injury, numbness or tingling or loss of feeling, worsening redness/tenderness/swelling or red streaks progressing up into the left lower leg.    Blood pressure was elevated during today's visit. The patient denies symptoms of chest pain, shortness of breath, palpitations, dizziness, lightheadedness, headache, or neurologic dysfunction.  Recommend checking blood pressure later today and every other day for the next 2 weeks.  If elevated over 160/100 consistently, recommend follow-up in clinic within the next 2 days.  Recommend presenting to the emergency room if your blood pressure remains greater than 180/110 or if you develop symptoms consistent with stroke or heart attack.

## 2022-06-09 ENCOUNTER — VIRTUAL VISIT (OUTPATIENT)
Dept: NEUROLOGY | Facility: CLINIC | Age: 82
End: 2022-06-09
Payer: MEDICARE

## 2022-06-09 DIAGNOSIS — F07.81 POST CONCUSSION SYNDROME: Primary | ICD-10-CM

## 2022-06-09 PROCEDURE — 99214 OFFICE O/P EST MOD 30 MIN: CPT | Mod: 95 | Performed by: NURSE PRACTITIONER

## 2022-06-09 NOTE — LETTER
"    6/9/2022         RE: Mercedes Gomez  [address] [facility] [address]        Dear Colleague,    Thank you for referring your patient, Mercedes Gomez, to the [facility]. Please see a copy of my visit note below.    Video Visit: Concussion Follow up:     Mercedes Gomez is a 81 year old female who is being evaluated via a billable video visit in light of the ongoing global health crisis (COVID-19) that requires us to abide by social distancing mandates in order to reduce the risk of COVID-19 exposure.       The patient has been notified of the following:     \"This video visit will be conducted via a video call between you and your physician/provider. We have found that certain health care needs can be provided without the need for a physical exam.  This service lets us provide the care you need with a short video conversation.  If a prescription is necessary we can send it directly to your pharmacy.  If lab work is needed we can place an order for that and you can then stop by our lab to have the test done at a later time.    If during the course of the call the physician/provider feels a telephone visit is not appropriate, you will not be charged for this service.\"     Patient has given verbal consent to a video visit? Yes      Visit Check In:     Orders from previous visit: Referral for PT  Neuropsychological assessment completed    No   Currently doing PT  No      Currently doing OT  No      Currently doing ST   No      Psychology  No     Any new medication (other provider):   No   Meds started at last appointment  No    Meds increased/decreased at last appointment        Currently on medication to help with sleep    No        Currently on any mental health medications     No          Currently on medication for attention, ADD/ADHD    No        Questions/Concerns?    No                                                       Start Time: 10:53 AM    End " Time:  11:00 AM    Total time of phone call: 7 minutes    Patient would like the video invitation sent by:  [facility]   Number/e-mail address:   [phone]     ABIGAIL Knox    Outpatient Follow up Mild TBI (Concussion)  Evaluation:     Mercedes Gomez chief complaint is Post Concussion Syndrome     Is patient on a controlled substance prescribed by me?  No     HPI:        Pertinent History:  Mercedes Gomez is a 81 year old female with documented past medical history which includes atrial fibrillation on chronic anticoagulation therapy via Xarelto who presents to the department for evaluation of headache after fall on Sunday.  Patient explains that 5 days ago she was carrying an appliance in her house when she tripped over a stool causing her to twist and fall forward, the right side of her head struck a nearby antique ashtray.  She also landed on buttocks causing low back pain, however she attributes this to chronic low back pain as the location and sensation seem unchanged.  She reports that her  heard the incident and came to find her laying on the ground, no loss of consciousness or confusion thereafter.  The patient was helped upright and had some mild right knee pain which has since resolved.  She has had intermittent achy frontal head pains since the fall for which she has been taking acetaminophen for moderate relief.  Low back pain it is only intermittent and acetaminophen helps with that as well.  She specifically denies fever, chills, chest pain, shortness of breath, abdominal pain, leg weakness or sensory deficits.    Date of accident :  1/8/2022    Plan:          We discussed some treatment options and have elected to monitor any concussive symptoms.    Medication Adjustment:  No medication changes    Return to clinic 4 months    Continue with the support of the clinic, reassurance, and redirection. Staff monitoring and ongoing assessments per team plan. This team will  "utilize appropriate emergency services if necessary. I will make myself available if concerns or problems arise.  The patient agrees to call/message before her next visit with any questions, concerns or problems.    Progress Note:        The patient returns to the concussion clinic for a follow up visit, She was last seen by me on 3/28/22, where I put in a referral for OT. Patient reports that she did have a day where she was walking around her house and felt dizzy.  She reports that she is doing good she did have to go to the ER because she dropped a rock on her foot.  She reports a family no longer is worried about her cognitive symptoms.  She states that she will have brain fog \"every now and then\" but overall she is feeling good and feels that she has recovered from her concussion.  We will do a follow-up visit in 4 months just to make sure that all symptoms have resolved.    Subjective:          Overall improvement from last visit   Yes     Headaches:  Significant ongoing headaches No   Headaches: Resolved  Improvement :Yes   Current Headache No   Wake with HA  No     Worse Headache    3/10           How often: does not know, it comes on whenever    Average Headache 0/10.    Best Headache 0/10.  Brings on HA/Makes symptoms worse:   Possibly the eyes  Makes symptoms better. N/A  Taking  acetaminophen (Tylenol)        Helpful:  Yes     Physical Symptoms:  Headache-No     Since last visit  Same     Nausea-No         Since last visit  Same       Balance problems - No    Since last visit  Same      Dizziness - Yes when have foggy days          Since last visit  Same     Visual problems - No    Since last visit  Same     Fatigue - No              Since last visit  Same     Sensitivity to light - No        Since last visit  Same     Sensitivity to sound - No       Since last visit  Same     Numbness/tingling - No     Since last visit  Same         Cognitive Symptoms  Feeling mentally foggy -Yes, occasionally        " Since last visit  Same     Feeling slowed down -No        Since last visit  Same     Difficulty Concentrating- No      Since last visit  Same     Difficulty remembering - No        Since last visit  Same       Emotional Symptoms  Irritability - No        Since last visit  Same     Sadness-  No      Since last visit  Same     More emotional - No       Since last visit  Same     Nervousness/anxiety -No       Since last visit  Same       Sleep History:  Sleep less than usual - No    Sleep more than usual - No    Trouble falling asleep - No       Since last visit  Same     Trouble staying asleep - Yes, occasionally       Since last visit  Same     Wake feeling rested - Yes         Since last visit  Same        Migraine Headaches      Patient history of migraines.   No        Exertion:         Do the above stated symptoms worsen with physical activity? No        Since last visit  Same           Do the above stated symptoms worsen with cognitive activity? No       Since last visit  Same            Work/School        Do the above stated symptoms worsen with school/work?        N/A          Have your returned to work/school? N/A      Full time    N/A      Number of hours a day   0      Number of days a week   0     Objective:          Patient Active Problem List    Diagnosis Date Noted     Disturbances of sensation of smell and taste 01/13/2022     Priority: Medium     Formatting of this note might be different from the original.  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  stable by ultrasound 11/17; recommend repeat ultrasound 11/19       Severe obesity (BMI 35.0-39.9) with comorbidity (H) 02/24/2017     Priority: Medium     Overactive bladder 04/28/2014     Priority: Medium     Osteopenia 10/22/2013     Priority: Medium     Anxiety 08/26/2013     Priority: Medium     Paroxysmal atrial fibrillation (H) 08/26/2013     Priority: Medium     Humerus fracture 09/17/2012     Priority: Medium     Proximal humeral fracture 09/17/2012     Priority: Medium     Knee pain, right 10/10/2011     Priority: Medium     Insulin pump status 05/08/2009     Priority: Medium     Formatting of this note might be different from the original.  PLACED 4-       Type 1 diabetes mellitus without complication (H) 10/01/2008     Priority: Medium     Formatting of this note might be different from the original.  diagnosis 1991 with type 2; c-peptide low as of 2008  history of albuminuria       Hyperlipidemia due to type 1 diabetes mellitus (H) 09/25/2007     Priority: Medium     No past medical history on file.  No past surgical history on file.  Family History   Problem Relation Age of Onset     Alzheimer Disease Mother      Current Outpatient Medications   Medication Sig Dispense Refill     ACCU-CHEK ACTIVE CHECK 4-6 TIMES DAILY       aspirin (ASA) 81 MG EC tablet Take 81 mg by mouth       calcium carbonate 1250 (500 Ca) MG CHEW Take 1,250 mg by mouth       Continuous Blood Gluc  (DEXCOM G6 ) FABIAN As directed. This is a replacement ; she does not need the sensors or transmitter.       Continuous Blood Gluc Sensor (DEXCOM G6 SENSOR) MISC As directed.       CRANBERRY OR 1680 MG ORALLY DAILY (Patient not taking: No sig reported)       insulin aspart (NOVOLOG VIAL) 100 UNITS/ML vial INJECT UP TO 60 UNITS VIA PUMP DAILY       lisinopril (ZESTRIL) 5 MG tablet Take 5 mg by mouth (Patient not taking: Reported on 3/28/2022)       metoprolol  succinate ER (TOPROL-XL) 25 MG 24 hr tablet Take 2 tablets (50 mg) by mouth daily 30 tablet 0     NOVOLOG 100 UNIT/ML SC SOLN via PUMP, up to 60 units daily       pravastatin (PRAVACHOL) 40 MG tablet Take 40 mg by mouth       rivaroxaban ANTICOAGULANT (XARELTO) 20 MG TABS tablet TAKE 1 TABLET BY MOUTH ONCE DAILY WITH EVENING MEAL.       SUBCUTANEOUS INF PUMP SUPPLIES PT HAS ANIMAS PUMP       vitamin B-12 (CYANOCOBALAMIN) 500 MCG tablet Take 500 mcg by mouth       VITAMIN D 1000 UNIT OR CAPS 2 CAPSULES DAILY       Social History     Socioeconomic History     Marital status:      Spouse name: Not on file     Number of children: Not on file     Years of education: Not on file     Highest education level: Not on file   Occupational History     Not on file   Tobacco Use     Smoking status: Never Smoker     Smokeless tobacco: Not on file   Substance and Sexual Activity     Alcohol use: No     Drug use: No     Sexual activity: Not on file   Other Topics Concern     Parent/sibling w/ CABG, MI or angioplasty before 65F 55M? Not Asked   Social History Narrative     Not on file     Social Determinants of Health     Financial Resource Strain: Not on file   Food Insecurity: Not on file   Transportation Needs: Not on file   Physical Activity: Not on file   Stress: Not on file   Social Connections: Not on file   Intimate Partner Violence: Not on file   Housing Stability: Not on file       ALLERGIES  Adhesive tape and Sulfa drugs    The following portions of the patient's history were reviewed and updated as appropriate: allergies, current medications, past family history, past medical history, past social history, past surgical history and problem list.    Review of Systems  A comprehensive review of systems was negative except for: What is noted above    Mental Status Examination  Alertness:  alert  and oriented  Appearance:  well groomed  Behavior/Demeanor:  cooperative, pleasant and calm, with good  eye contact.  Speech:   normal  Psychomotor:  normal or unremarkable    Mood:  good  Affect:  appropriate and was congruent to speech content.  Thought Process/Associations: unremarkable   Thought Content: devoid of  suicidal and violent ideation and delusions.   Perception: devoid of  hallucinations  Insight:  good.  Judgment: good.  Attention/Concentration:  Normal  Language:  Intact  Fund of Knowledge:  Average.    Memory:  Immediate recall intact, Short-term memory intact and Long-term memory intact.         Counseling:   Discussion was held with the patient today regarding concussion in general including types of injury, symptoms that are common, treatment and variability in time to recover  I have reassured the patient her symptoms are very common when a concussion is present and will improve with time. We discussed the risks and benefits of possible medication used to help concussive symptoms including risk of worsening depression with medication adjustments and even the possibility of emergence of suicidal ideations. We will assess for the appropriateness of possible psychotropic medication trials/changes. The patient will seek out appropriate emergency services should that become necessary. The patient agrees to call/message before her next visit with any questions, concerns or problems.    Visit Details:     Type of service: Video Visit    Video Start Time: 1110    Video End Time:  1130    Total time of video visit: 20 minutes    Originating Location: Patient's home    Distant Location:  [facility]    Mode of Communication: Video Conference via  [employer]     Diagnosis managed and treated at today's visit :  Post concussion syndrome  Post concussion headache  Nausea  Dizziness  Fatigue  Insomnia  Sensitivity to light  Sound sensitivity  Concentration and Attention deficit  Memory difficulties  Anxiety d/t a medical condition  Irritability    Total time spent with the patient today was 40  minutes with greater than 50% of the time spent in counseling and care coordination.     10 minutes spent on the date of the encounter doing chart review, review of outside records, review of test results, interpretation of tests and documentation    General Information:     Today you had your appointment with Christal Casas CNP     If lab work was done today as part of your evaluation you will generally be contacted via My Chart, mail, or phone with the results within 1-5 days. If there is an alarming result we will contact you by phone. Lab results come back at varying times, I generally wait until all labs are resulted before making comments on results. Please note labs are automatically released to My Chart once available.     If you need refills please contact your pharmacist. They will send a refill request to me to review. Please allow 3 business days for us to process all refill requests.     Please call or send a medical message through My Chart, with any questions or concerns    If you need any paperwork completed please fax forms to [phone]. Please state if you would like a copy of the completed paperwork, mailed or faxed back to the patient and a fax number to fax the paperwork to. Please allow up to 10 business days for paperwork to be completed.      ANDREA Martell, CNP      [facility]  [facility] [address]  [address] [place] [id number]  Office: [phone]  Fax: [phone]            Again, thank you for allowing me to participate in the care of your patient.        Sincerely,        ANDREA Martell CNP

## 2022-06-09 NOTE — PROGRESS NOTES
"Video Visit: Concussion Follow up:     Mercedes Gomez is a 81 year old female who is being evaluated via a billable video visit in light of the ongoing global health crisis (COVID-19) that requires us to abide by social distancing mandates in order to reduce the risk of COVID-19 exposure.       The patient has been notified of the following:     \"This video visit will be conducted via a video call between you and your physician/provider. We have found that certain health care needs can be provided without the need for a physical exam.  This service lets us provide the care you need with a short video conversation.  If a prescription is necessary we can send it directly to your pharmacy.  If lab work is needed we can place an order for that and you can then stop by our lab to have the test done at a later time.    If during the course of the call the physician/provider feels a telephone visit is not appropriate, you will not be charged for this service.\"     Patient has given verbal consent to a video visit? Yes      Visit Check In:     Orders from previous visit: Referral for PT  Neuropsychological assessment completed    No   Currently doing PT  No      Currently doing OT  No      Currently doing ST   No      Psychology  No     Any new medication (other provider):   No   Meds started at last appointment  No    Meds increased/decreased at last appointment        Currently on medication to help with sleep    No        Currently on any mental health medications     No          Currently on medication for attention, ADD/ADHD    No        Questions/Concerns?    No                                                       Start Time: 10:53 AM    End Time:  11:00 AM    Total time of phone call: 7 minutes    Patient would like the video invitation sent by:  [employer]   Number/e-mail address:   [phone]     ABIGAIL Knox    Outpatient Follow up Mild TBI (Concussion)  Evaluation:     eMrcedes Gomez chief " complaint is Post Concussion Syndrome     Is patient on a controlled substance prescribed by me?  No     HPI:        Pertinent History:  Mercedes Gomez is a 81 year old female with documented past medical history which includes atrial fibrillation on chronic anticoagulation therapy via Xarelto who presents to the department for evaluation of headache after fall on Sunday.  Patient explains that 5 days ago she was carrying an appliance in her house when she tripped over a stool causing her to twist and fall forward, the right side of her head struck a nearby antique ashtray.  She also landed on buttocks causing low back pain, however she attributes this to chronic low back pain as the location and sensation seem unchanged.  She reports that her  heard the incident and came to find her laying on the ground, no loss of consciousness or confusion thereafter.  The patient was helped upright and had some mild right knee pain which has since resolved.  She has had intermittent achy frontal head pains since the fall for which she has been taking acetaminophen for moderate relief.  Low back pain it is only intermittent and acetaminophen helps with that as well.  She specifically denies fever, chills, chest pain, shortness of breath, abdominal pain, leg weakness or sensory deficits.    Date of accident :  1/8/2022    Plan:          We discussed some treatment options and have elected to monitor any concussive symptoms.    Medication Adjustment:  No medication changes    Return to clinic 4 months    Continue with the support of the clinic, reassurance, and redirection. Staff monitoring and ongoing assessments per team plan. This team will utilize appropriate emergency services if necessary. I will make myself available if concerns or problems arise.  The patient agrees to call/message before her next visit with any questions, concerns or problems.    Progress Note:        The patient returns to the concussion  "clinic for a follow up visit, She was last seen by me on 3/28/22, where I put in a referral for OT. Patient reports that she did have a day where she was walking around her house and felt dizzy.  She reports that she is doing good she did have to go to the ER because she dropped a rock on her foot.  She reports a family no longer is worried about her cognitive symptoms.  She states that she will have brain fog \"every now and then\" but overall she is feeling good and feels that she has recovered from her concussion.  We will do a follow-up visit in 4 months just to make sure that all symptoms have resolved.    Subjective:          Overall improvement from last visit   Yes     Headaches:  Significant ongoing headaches No   Headaches: Resolved  Improvement :Yes   Current Headache No   Wake with HA  No     Worse Headache    3/10           How often: does not know, it comes on whenever    Average Headache 0/10.    Best Headache 0/10.  Brings on HA/Makes symptoms worse:   Possibly the eyes  Makes symptoms better. N/A  Taking  acetaminophen (Tylenol)        Helpful:  Yes     Physical Symptoms:  Headache-No     Since last visit  Same     Nausea-No         Since last visit  Same       Balance problems - No    Since last visit  Same      Dizziness - Yes when have foggy days          Since last visit  Same     Visual problems - No    Since last visit  Same     Fatigue - No              Since last visit  Same     Sensitivity to light - No        Since last visit  Same     Sensitivity to sound - No       Since last visit  Same     Numbness/tingling - No     Since last visit  Same         Cognitive Symptoms  Feeling mentally foggy -Yes, occasionally        Since last visit  Same     Feeling slowed down -No        Since last visit  Same     Difficulty Concentrating- No      Since last visit  Same     Difficulty remembering - No        Since last visit  Same       Emotional Symptoms  Irritability - No        Since last visit  Same  "    Sadness-  No      Since last visit  Same     More emotional - No       Since last visit  Same     Nervousness/anxiety -No       Since last visit  Same       Sleep History:  Sleep less than usual - No    Sleep more than usual - No    Trouble falling asleep - No       Since last visit  Same     Trouble staying asleep - Yes, occasionally       Since last visit  Same     Wake feeling rested - Yes         Since last visit  Same        Migraine Headaches      Patient history of migraines.   No        Exertion:         Do the above stated symptoms worsen with physical activity? No        Since last visit  Same           Do the above stated symptoms worsen with cognitive activity? No       Since last visit  Same            Work/School        Do the above stated symptoms worsen with school/work?        N/A          Have your returned to work/school? N/A      Full time    N/A      Number of hours a day   0      Number of days a week   0     Objective:          Patient Active Problem List    Diagnosis Date Noted     Disturbances of sensation of smell and taste 01/13/2022     Priority: Medium     Formatting of this note might be different from the original.  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  stable by ultrasound 11/17; recommend repeat ultrasound 11/19       Severe obesity (BMI 35.0-39.9) with comorbidity (H) 02/24/2017     Priority: Medium     Overactive bladder 04/28/2014     Priority: Medium     Osteopenia  10/22/2013     Priority: Medium     Anxiety 08/26/2013     Priority: Medium     Paroxysmal atrial fibrillation (H) 08/26/2013     Priority: Medium     Humerus fracture 09/17/2012     Priority: Medium     Proximal humeral fracture 09/17/2012     Priority: Medium     Knee pain, right 10/10/2011     Priority: Medium     Insulin pump status 05/08/2009     Priority: Medium     Formatting of this note might be different from the original.  PLACED 4-       Type 1 diabetes mellitus without complication (H) 10/01/2008     Priority: Medium     Formatting of this note might be different from the original.  diagnosis 1991 with type 2; c-peptide low as of 2008  history of albuminuria       Hyperlipidemia due to type 1 diabetes mellitus (H) 09/25/2007     Priority: Medium     No past medical history on file.  No past surgical history on file.  Family History   Problem Relation Age of Onset     Alzheimer Disease Mother      Current Outpatient Medications   Medication Sig Dispense Refill     ACCU-CHEK ACTIVE CHECK 4-6 TIMES DAILY       aspirin (ASA) 81 MG EC tablet Take 81 mg by mouth       calcium carbonate 1250 (500 Ca) MG CHEW Take 1,250 mg by mouth       Continuous Blood Gluc  (DEXCOM G6 ) FABIAN As directed. This is a replacement ; she does not need the sensors or transmitter.       Continuous Blood Gluc Sensor (DEXCOM G6 SENSOR) MISC As directed.       CRANBERRY OR 1680 MG ORALLY DAILY (Patient not taking: No sig reported)       insulin aspart (NOVOLOG VIAL) 100 UNITS/ML vial INJECT UP TO 60 UNITS VIA PUMP DAILY       lisinopril (ZESTRIL) 5 MG tablet Take 5 mg by mouth (Patient not taking: Reported on 3/28/2022)       metoprolol succinate ER (TOPROL-XL) 25 MG 24 hr tablet Take 2 tablets (50 mg) by mouth daily 30 tablet 0     NOVOLOG 100 UNIT/ML SC SOLN via PUMP, up to 60 units daily       pravastatin (PRAVACHOL) 40 MG tablet Take 40 mg by mouth       rivaroxaban ANTICOAGULANT (XARELTO) 20 MG TABS  tablet TAKE 1 TABLET BY MOUTH ONCE DAILY WITH EVENING MEAL.       SUBCUTANEOUS INF PUMP SUPPLIES PT HAS ANIMAS PUMP       vitamin B-12 (CYANOCOBALAMIN) 500 MCG tablet Take 500 mcg by mouth       VITAMIN D 1000 UNIT OR CAPS 2 CAPSULES DAILY       Social History     Socioeconomic History     Marital status:      Spouse name: Not on file     Number of children: Not on file     Years of education: Not on file     Highest education level: Not on file   Occupational History     Not on file   Tobacco Use     Smoking status: Never Smoker     Smokeless tobacco: Not on file   Substance and Sexual Activity     Alcohol use: No     Drug use: No     Sexual activity: Not on file   Other Topics Concern     Parent/sibling w/ CABG, MI or angioplasty before 65F 55M? Not Asked   Social History Narrative     Not on file     Social Determinants of Health     Financial Resource Strain: Not on file   Food Insecurity: Not on file   Transportation Needs: Not on file   Physical Activity: Not on file   Stress: Not on file   Social Connections: Not on file   Intimate Partner Violence: Not on file   Housing Stability: Not on file       ALLERGIES  Adhesive tape and Sulfa drugs    The following portions of the patient's history were reviewed and updated as appropriate: allergies, current medications, past family history, past medical history, past social history, past surgical history and problem list.    Review of Systems  A comprehensive review of systems was negative except for: What is noted above    Mental Status Examination  Alertness:  alert  and oriented  Appearance:  well groomed  Behavior/Demeanor:  cooperative, pleasant and calm, with good  eye contact.  Speech:  normal  Psychomotor:  normal or unremarkable    Mood:  good  Affect:  appropriate and was congruent to speech content.  Thought Process/Associations: unremarkable   Thought Content: devoid of  suicidal and violent ideation and delusions.   Perception: devoid of   hallucinations  Insight:  good.  Judgment: good.  Attention/Concentration:  Normal  Language:  Intact  Fund of Knowledge:  Average.    Memory:  Immediate recall intact, Short-term memory intact and Long-term memory intact.         Counseling:   Discussion was held with the patient today regarding concussion in general including types of injury, symptoms that are common, treatment and variability in time to recover  I have reassured the patient her symptoms are very common when a concussion is present and will improve with time. We discussed the risks and benefits of possible medication used to help concussive symptoms including risk of worsening depression with medication adjustments and even the possibility of emergence of suicidal ideations. We will assess for the appropriateness of possible psychotropic medication trials/changes. The patient will seek out appropriate emergency services should that become necessary. The patient agrees to call/message before her next visit with any questions, concerns or problems.    Visit Details:     Type of service: Video Visit    Video Start Time: 1110    Video End Time:  1130    Total time of video visit: 20 minutes    Originating Location: Patient's home    Distant Location:  [facility]  [place]    Mode of Communication: Video Conference via  [facility]     Diagnosis managed and treated at today's visit :  Post concussion syndrome  Post concussion headache  Nausea  Dizziness  Fatigue  Insomnia  Sensitivity to light  Sound sensitivity  Concentration and Attention deficit  Memory difficulties  Anxiety d/t a medical condition  Irritability    Total time spent with the patient today was 40 minutes with greater than 50% of the time spent in counseling and care coordination.     10 minutes spent on the date of the encounter doing chart review, review of outside records, review of test results, interpretation of tests and documentation    General  Information:     Today you had your appointment with Christal Casas CNP     If lab work was done today as part of your evaluation you will generally be contacted via My Chart, mail, or phone with the results within 1-5 days. If there is an alarming result we will contact you by phone. Lab results come back at varying times, I generally wait until all labs are resulted before making comments on results. Please note labs are automatically released to My Chart once available.     If you need refills please contact your pharmacist. They will send a refill request to me to review. Please allow 3 business days for us to process all refill requests.     Please call or send a medical message through My Chart, with any questions or concerns    If you need any paperwork completed please fax forms to [phone]. Please state if you would like a copy of the completed paperwork, mailed or faxed back to the patient and a fax number to fax the paperwork to. Please allow up to 10 business days for paperwork to be completed.      ANDREA Martell CNP      [facility]  [facility] [address]  [address] [place] [id number]  Office: [phone]  Fax: [phone]

## 2022-10-17 ENCOUNTER — VIRTUAL VISIT (OUTPATIENT)
Dept: NEUROLOGY | Facility: CLINIC | Age: 82
End: 2022-10-17
Payer: MEDICARE

## 2022-10-17 DIAGNOSIS — R79.89 LOW VITAMIN B12 LEVEL: ICD-10-CM

## 2022-10-17 DIAGNOSIS — F07.81 POST CONCUSSION SYNDROME: Primary | ICD-10-CM

## 2022-10-17 PROCEDURE — 99213 OFFICE O/P EST LOW 20 MIN: CPT | Mod: 95 | Performed by: NURSE PRACTITIONER

## 2022-10-17 RX ORDER — MAGNESIUM 200 MG
1000 TABLET ORAL DAILY
Qty: 90 TABLET | Refills: 3 | Status: SHIPPED | OUTPATIENT
Start: 2022-10-17

## 2022-10-17 NOTE — NURSING NOTE
CONCUSSION SYMPTOMS ASSESSMENT 4/11/2022 10/17/2022   Headache or Pressure In Head 0 - none 0 - none   Upset Stomach or Throwing Up 0 - none 0 - none   Problems with Balance 0 - none 0 - none   Feeling Dizzy 0 - none 0 - none   Sensitivity to Light 0 - none 0 - none   Sensitivity to Noise 0 - none 0 - none   Mood Changes 0 - none 0 - none   Feeling sluggish, hazy, or foggy 1 - mild 0 - none   Trouble Concentrating, Lack of Focus 1 - mild 0 - none   Motion Sickness 0 - none 0 - none   Vision Changes 0 - none 0 - none   Memory Problems 1 - mild 0 - none   Feeling Confused 1 - mild 0 - none   Neck Pain 0 - none 0 - none   Trouble Sleeping 1 - mild 1 - mild   Total Number of Symptoms 5 1   Symptom Severity Score 5 1

## 2022-10-18 NOTE — PROGRESS NOTES
"Visit start time-1016  Video Visit: Concussion Follow up:   Mercedes Gomez is a 81 year old female who is being evaluated via a billable video visit       The patient has been notified of the following:     \"This video visit will be conducted via a video call between you and your provider. We have found that certain health care needs can be provided without the need for a physical exam.  This service lets us provide the care you need with a short video conversation.  If a prescription is necessary we can send it directly to your pharmacy.  If lab work is needed we can place an order for that and you can then stop by our lab to have the test done at a later time.    If during the course of the call the provider feels a video visit is not appropriate, you will not be charged for this service.\"     Patient has given verbal consent to a video visit? Yes    Visit Check In:   Orders from previous visit: PT and vitamin B12 lab  Neuropsychological assessment completed    Yes   PT   Completed Yes   OT   Completed Yes   ST   Completed No   Psychology  No   Return to Work/School   Full scheduled hours  Patient is retired    Currently on medication to help with sleep    No      Currently on any mental health medications     No      Currently on medication for attention, ADD/ADHD    No                                                     Outpatient Follow up Mild TBI (Concussion)  Evaluation:   Mercedes Gomez chief complaint is Post Concussion Syndrome     Is patient on a controlled substance prescribed by me?  No     HPI:      Pertinent History:  Mercedes Gomez is a 81 year old female with documented past medical history which includes atrial fibrillation on chronic anticoagulation therapy via Xarelto who presents to the department for evaluation of headache after fall on Sunday.  Patient explains that 5 days ago she was carrying an appliance in her house when she tripped over a stool causing her to twist and " fall forward, the right side of her head struck a nearby antique ashtray.  She also landed on buttocks causing low back pain, however she attributes this to chronic low back pain as the location and sensation seem unchanged.  She reports that her  heard the incident and came to find her laying on the ground, no loss of consciousness or confusion thereafter.  The patient was helped upright and had some mild right knee pain which has since resolved.  She has had intermittent achy frontal head pains since the fall for which she has been taking acetaminophen for moderate relief.  Low back pain it is only intermittent and acetaminophen helps with that as well.  She specifically denies fever, chills, chest pain, shortness of breath, abdominal pain, leg weakness or sensory deficits.     Date of accident :  1/8/2022    Plan:        We discussed some treatment options and have elected to   Check B12 again, SL B12, patient will think about taking ST if she does not think cognition has returned to baseline.    Medication Adjustment:  Vitamin B12 1000 mcg, take one tab SL every day    Return to Work/School  Patient is retired    Return to clinic 3 months    Continue with the support of the clinic, reassurance, and redirection. Staff monitoring and ongoing assessments per team plan. This team will utilize appropriate emergency services if necessary. I will make myself available if concerns or problems arise.  The patient agrees to call/message before her next visit with any questions, concerns or problems.    Progress Note:        The patient returns to the concussion clinic for a follow up visit, She was last seen by me on 6/9/2022, no medication changes were made at that appointment.  I did order a vitamin B12 level and the patient was deficient. The patient has been taking sublingual B12, so we will repeat test B12 and I will order sublingual B12 tablets for her.  Patient reports that she does think that her cognition  "has returned to \"almost baseline\".  She reports she has no problems playing bridge with her friends.  She did complete physical and Occupational Therapy.,  Overall patient is reporting slight problem with sleep.  She reports improvement in all other physical, cognitive, and emotional symptoms.     Subjective:        Overall improvement from last visit   Yes     Physical Symptoms:  Headache-No       Nausea-No               Balance problems - No         Dizziness - No              Visual problems - No        Fatigue - No              Sensitivity to light - No            Sensitivity to sound - No       Numbness/tingling - No            Cognitive Symptoms  Feeling mentally foggy -No       Feeling slowed down -No        Difficulty Concentrating- No     Difficulty remembering - No         Emotional Symptoms  Irritability - No             Sadness-  No          More emotional - No        Nervousness/anxiety -No       Sleep History:  Sleep less than usual - No    Sleep more than usual - No    Trouble falling asleep - No      Trouble staying asleep - No     Wake feeling rested - most of the time       Since last visit  Improved        Exertion:         Do the above stated symptoms worsen with physical activity? No          Do the above stated symptoms worsen with cognitive activity? 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  stable by ultrasound 11/17; recommend repeat ultrasound 11/19       Severe obesity (BMI 35.0-39.9) with comorbidity (H) 02/24/2017     Priority: Medium     Overactive bladder 04/28/2014     Priority: Medium     Osteopenia 10/22/2013     Priority: Medium     Anxiety 08/26/2013     Priority: Medium     Paroxysmal atrial fibrillation (H) 08/26/2013     Priority: Medium     Humerus fracture 09/17/2012     Priority: Medium     Proximal humeral fracture 09/17/2012     Priority: Medium     Knee pain, right 10/10/2011     Priority: Medium     Insulin pump status 05/08/2009     Priority: Medium     Formatting of this note might be different from the original.  PLACED 4-       Type 1 diabetes mellitus without complication (H) 10/01/2008     Priority: Medium     Formatting of this note might be different from the original.  diagnosis 1991 with type 2; c-peptide low as of 2008  history of albuminuria       Hyperlipidemia due to type 1 diabetes mellitus (H) 09/25/2007     Priority: Medium     No past medical history on file.  No past surgical history on file.  Family History   Problem Relation Age of Onset     Alzheimer Disease Mother      Current Outpatient Medications   Medication Sig Dispense Refill     ACCU-CHEK ACTIVE CHECK 4-6 TIMES DAILY       calcium carbonate 1250 (500 Ca) MG CHEW Take 1,250 mg by mouth       Continuous Blood Gluc  (DEXCOM G6 ) FABIAN As directed. This is a replacement ; she does not need the sensors or transmitter.       Continuous Blood Gluc Sensor (DEXCOM G6 SENSOR) MISC As directed.       cyanocobalamin 1000 MCG SUBL sublingual tablet Place 1 tablet (1,000 mcg) under the tongue daily 90 tablet 3     insulin aspart (NOVOLOG VIAL) 100 UNITS/ML vial INJECT UP TO 60 UNITS VIA PUMP DAILY       metoprolol succinate ER (TOPROL-XL) 25 MG 24 hr tablet  Take 2 tablets (50 mg) by mouth daily 30 tablet 0     NOVOLOG 100 UNIT/ML SC SOLN via PUMP, up to 60 units daily       pravastatin (PRAVACHOL) 40 MG tablet Take 40 mg by mouth       rivaroxaban ANTICOAGULANT (XARELTO) 20 MG TABS tablet TAKE 1 TABLET BY MOUTH ONCE DAILY WITH EVENING MEAL.       SUBCUTANEOUS INF PUMP SUPPLIES PT HAS ANIMAS PUMP       aspirin (ASA) 81 MG EC tablet Take 81 mg by mouth (Patient not taking: Reported on 10/17/2022)       CRANBERRY OR 1680 MG ORALLY DAILY (Patient not taking: No sig reported)       lisinopril (ZESTRIL) 5 MG tablet Take 5 mg by mouth (Patient not taking: No sig reported)       vitamin B-12 (CYANOCOBALAMIN) 500 MCG tablet Take 500 mcg by mouth (Patient not taking: Reported on 10/17/2022)       VITAMIN D 1000 UNIT OR CAPS 2 CAPSULES DAILY (Patient not taking: Reported on 10/17/2022)       Social History     Socioeconomic History     Marital status:      Spouse name: Not on file     Number of children: Not on file     Years of education: Not on file     Highest education level: Not on file   Occupational History     Not on file   Tobacco Use     Smoking status: Never     Smokeless tobacco: Never   Substance and Sexual Activity     Alcohol use: No     Drug use: No     Sexual activity: Not on file   Other Topics Concern     Parent/sibling w/ CABG, MI or angioplasty before 65F 55M? Not Asked   Social History Narrative     Not on file     Social Determinants of Health     Financial Resource Strain: Not on file   Food Insecurity: Not on file   Transportation Needs: Not on file   Physical Activity: Not on file   Stress: Not on file   Social Connections: Not on file   Intimate Partner Violence: Not on file   Housing Stability: Not on file       ALLERGIES  Adhesive tape and Sulfa drugs    The following portions of the patient's history were reviewed and updated as appropriate: allergies, current medications, past family history, past medical history, past social history, past  surgical history and problem list.    Review of Systems  A comprehensive review of systems was negative except for: What is noted above    Mental Status Examination  Alertness:  alert  and oriented  Appearance:  well groomed  Behavior/Demeanor:  cooperative, pleasant and calm, with good  eye contact.  Speech:  normal  Psychomotor:  normal or unremarkable    Mood:  good  Affect:  appropriate and was congruent to speech content.  Thought Process/Associations: unremarkable   Thought Content: devoid of  suicidal and violent ideation and delusions.   Perception: devoid of  hallucinations  Insight:  good.  Judgment: good.  Attention/Concentration:  Normal  Language:  Intact  Fund of Knowledge:  Average.    Memory:  Immediate recall intact, Short-term memory intact and Long-term memory intact.       Counseling:   Discussion was held with the patient today regarding concussion in general including types of injury, symptoms that are common, treatment and variability in time to recover  I have reassured the patient her symptoms are very common when a concussion is present and will improve with time. We discussed the risks and benefits of possible medication used to help concussive symptoms including risk of worsening depression with medication adjustments and even the possibility of emergence of suicidal ideations. We will assess for the appropriateness of possible psychotropic medication trials/changes. The patient will seek out appropriate emergency services should that become necessary. The patient agrees to call/message before her next visit with any questions, concerns or problems.    Visit Details:     Type of service: Video Visit    Video Start Time: 1016    Video End Time:  1033    Originating Location: Patient's home    Distant Location:  [facility]  [place]    Mode of Communication: Video Conference via  [employer] (My Chart)     Diagnosis managed and treated at today's visit :  Post  concussion syndrome  Post concussion headache  Nausea  Dizziness  Fatigue  Insomnia  Sensitivity to light  Sound sensitivity  Concentration and Attention deficit  Memory difficulties  Anxiety d/t a medical condition  Irritability    Total time today (25 min) in this patient encounter was spent on pre-charting, chart review, review of outside records, review of test results, interpretation of tests, patient visit and documentation and counseling and/or coordination of care. The patient is in agreement with this plan and has no further questions.    General Information:   Today you had your appointment with Christal Casas CNP     If lab work was done today as part of your evaluation you will generally be contacted via My Chart, mail, or phone with the results within 1-5 days. If there is an alarming result we will contact you by phone. Lab results come back at varying times, I generally wait until all labs are resulted before making comments on results. Please note labs are automatically released to My Chart once available.     If you need refills please contact your pharmacist. They will send a refill request to me to review. If it is a controlled substance please message me through [email or web address]. Please allow 3 business days for us to process all refill requests.     Please call or send a medical message through My Chart, with any questions or concerns    If you need any paperwork completed please fax forms to [phone]. Please state if you would like a copy of the completed paperwork, mailed or faxed back to the patient and a fax number to fax the paperwork to. Please allow up to 10 business days for paperwork to be completed.    ANDREA Martell CNP      [facility]  [facility] [address]  [address] [place] [address]  Office: [phone]  Fax: [phone]

## 2023-01-13 NOTE — LETTER
"    10/17/2022         RE: Mercedes Gomez  [address] [facility] [address]        Dear Colleague,    Thank you for referring your patient, Mercedes Gomez, to the [facility]. Please see a copy of my visit note below.    Visit start time-1016  Video Visit: Concussion Follow up:   Mercedes Gomez is a 81 year old female who is being evaluated via a billable video visit       The patient has been notified of the following:     \"This video visit will be conducted via a video call between you and your provider. We have found that certain health care needs can be provided without the need for a physical exam.  This service lets us provide the care you need with a short video conversation.  If a prescription is necessary we can send it directly to your pharmacy.  If lab work is needed we can place an order for that and you can then stop by our lab to have the test done at a later time.    If during the course of the call the provider feels a video visit is not appropriate, you will not be charged for this service.\"     Patient has given verbal consent to a video visit? Yes    Visit Check In:   Orders from previous visit: PT and vitamin B12 lab  Neuropsychological assessment completed    Yes   PT   Completed Yes   OT   Completed Yes   ST   Completed No   Psychology  No   Return to Work/School   Full scheduled hours  Patient is retired    Currently on medication to help with sleep    No      Currently on any mental health medications     No      Currently on medication for attention, ADD/ADHD    No                                                     Outpatient Follow up Mild TBI (Concussion)  Evaluation:   Mercedes Gomez chief complaint is Post Concussion Syndrome     Is patient on a controlled substance prescribed by me?  No     HPI:      Pertinent History:  Mercedes Gomez is a 81 year old female with documented past medical history which includes " atrial fibrillation on chronic anticoagulation therapy via Xarelto who presents to the department for evaluation of headache after fall on Sunday.  Patient explains that 5 days ago she was carrying an appliance in her house when she tripped over a stool causing her to twist and fall forward, the right side of her head struck a nearby antique ashtray.  She also landed on buttocks causing low back pain, however she attributes this to chronic low back pain as the location and sensation seem unchanged.  She reports that her  heard the incident and came to find her laying on the ground, no loss of consciousness or confusion thereafter.  The patient was helped upright and had some mild right knee pain which has since resolved.  She has had intermittent achy frontal head pains since the fall for which she has been taking acetaminophen for moderate relief.  Low back pain it is only intermittent and acetaminophen helps with that as well.  She specifically denies fever, chills, chest pain, shortness of breath, abdominal pain, leg weakness or sensory deficits.     Date of accident :  1/8/2022    Plan:        We discussed some treatment options and have elected to   Check B12 again, SL B12, patient will think about taking ST if she does not think cognition has returned to baseline.    Medication Adjustment:  Vitamin B12 1000 mcg, take one tab SL every day    Return to Work/School  Patient is retired    Return to clinic 3 months    Continue with the support of the clinic, reassurance, and redirection. Staff monitoring and ongoing assessments per team plan. This team will utilize appropriate emergency services if necessary. I will make myself available if concerns or problems arise.  The patient agrees to call/message before her next visit with any questions, concerns or problems.    Progress Note:        The patient returns to the concussion clinic for a follow up visit, She was last seen by me on 6/9/2022, no medication  "changes were made at that appointment.  I did order a vitamin B12 level and the patient was deficient. The patient has been taking sublingual B12, so we will repeat test B12 and I will order sublingual B12 tablets for her.  Patient reports that she does think that her cognition has returned to \"almost baseline\".  She reports she has no problems playing bridge with her friends.  She did complete physical and Occupational Therapy.,  Overall patient is reporting slight problem with sleep.  She reports improvement in all other physical, cognitive, and emotional symptoms.     Subjective:        Overall improvement from last visit   Yes     Physical Symptoms:  Headache-No       Nausea-No               Balance problems - No         Dizziness - No              Visual problems - No        Fatigue - No              Sensitivity to light - No            Sensitivity to sound - No       Numbness/tingling - No            Cognitive Symptoms  Feeling mentally foggy -No       Feeling slowed down -No        Difficulty Concentrating- No     Difficulty remembering - No         Emotional Symptoms  Irritability - No             Sadness-  No          More emotional - No        Nervousness/anxiety -No       Sleep History:  Sleep less than usual - No    Sleep more than usual - No    Trouble falling asleep - No      Trouble staying asleep - No     Wake feeling rested - most of the time       Since last visit  Improved        Exertion:         Do the above stated symptoms worsen with physical activity? No          Do the above stated symptoms worsen with cognitive activity? 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  stable by ultrasound 11/17; recommend repeat ultrasound 11/19       Severe obesity (BMI 35.0-39.9) with comorbidity (H) 02/24/2017     Priority: Medium     Overactive bladder 04/28/2014     Priority: Medium     Osteopenia 10/22/2013     Priority: Medium     Anxiety 08/26/2013     Priority: Medium     Paroxysmal atrial fibrillation (H) 08/26/2013     Priority: Medium     Humerus fracture 09/17/2012     Priority: Medium     Proximal humeral fracture 09/17/2012     Priority: Medium     Knee pain, right 10/10/2011     Priority: Medium     Insulin pump status 05/08/2009     Priority: Medium     Formatting of this note might be different from the original.  PLACED 4-       Type 1 diabetes mellitus without complication (H) 10/01/2008     Priority: Medium     Formatting of this note might be different from the original.  diagnosis 1991 with type 2; c-peptide low as of 2008  history of albuminuria       Hyperlipidemia due to type 1 diabetes mellitus (H) 09/25/2007     Priority: Medium     No past medical history on file.  No past surgical history on file.  Family History   Problem Relation Age of Onset     Alzheimer Disease Mother      Current Outpatient Medications   Medication Sig Dispense Refill     ACCU-CHEK ACTIVE CHECK 4-6 TIMES DAILY       calcium carbonate 1250 (500 Ca) MG CHEW Take 1,250 mg by mouth       Continuous Blood Gluc  (DEXCOM G6 ) FABIAN As directed. This is a replacement ; she does not need the sensors or transmitter.       Continuous Blood Gluc Sensor (DEXCOM G6 SENSOR) MISC  As directed.       cyanocobalamin 1000 MCG SUBL sublingual tablet Place 1 tablet (1,000 mcg) under the tongue daily 90 tablet 3     insulin aspart (NOVOLOG VIAL) 100 UNITS/ML vial INJECT UP TO 60 UNITS VIA PUMP DAILY       metoprolol succinate ER (TOPROL-XL) 25 MG 24 hr tablet Take 2 tablets (50 mg) by mouth daily 30 tablet 0     NOVOLOG 100 UNIT/ML SC SOLN via PUMP, up to 60 units daily       pravastatin (PRAVACHOL) 40 MG tablet Take 40 mg by mouth       rivaroxaban ANTICOAGULANT (XARELTO) 20 MG TABS tablet TAKE 1 TABLET BY MOUTH ONCE DAILY WITH EVENING MEAL.       SUBCUTANEOUS INF PUMP SUPPLIES PT HAS ANIMAS PUMP       aspirin (ASA) 81 MG EC tablet Take 81 mg by mouth (Patient not taking: Reported on 10/17/2022)       CRANBERRY OR 1680 MG ORALLY DAILY (Patient not taking: No sig reported)       lisinopril (ZESTRIL) 5 MG tablet Take 5 mg by mouth (Patient not taking: No sig reported)       vitamin B-12 (CYANOCOBALAMIN) 500 MCG tablet Take 500 mcg by mouth (Patient not taking: Reported on 10/17/2022)       VITAMIN D 1000 UNIT OR CAPS 2 CAPSULES DAILY (Patient not taking: Reported on 10/17/2022)       Social History     Socioeconomic History     Marital status:      Spouse name: Not on file     Number of children: Not on file     Years of education: Not on file     Highest education level: Not on file   Occupational History     Not on file   Tobacco Use     Smoking status: Never     Smokeless tobacco: Never   Substance and Sexual Activity     Alcohol use: No     Drug use: No     Sexual activity: Not on file   Other Topics Concern     Parent/sibling w/ CABG, MI or angioplasty before 65F 55M? Not Asked   Social History Narrative     Not on file     Social Determinants of Health     Financial Resource Strain: Not on file   Food Insecurity: Not on file   Transportation Needs: Not on file   Physical Activity: Not on file   Stress: Not on file   Social Connections: Not on file   Intimate Partner Violence: Not on  file   Housing Stability: Not on file       ALLERGIES  Adhesive tape and Sulfa drugs    The following portions of the patient's history were reviewed and updated as appropriate: allergies, current medications, past family history, past medical history, past social history, past surgical history and problem list.    Review of Systems  A comprehensive review of systems was negative except for: What is noted above    Mental Status Examination  Alertness:  alert  and oriented  Appearance:  well groomed  Behavior/Demeanor:  cooperative, pleasant and calm, with good  eye contact.  Speech:  normal  Psychomotor:  normal or unremarkable    Mood:  good  Affect:  appropriate and was congruent to speech content.  Thought Process/Associations: unremarkable   Thought Content: devoid of  suicidal and violent ideation and delusions.   Perception: devoid of  hallucinations  Insight:  good.  Judgment: good.  Attention/Concentration:  Normal  Language:  Intact  Fund of Knowledge:  Average.    Memory:  Immediate recall intact, Short-term memory intact and Long-term memory intact.       Counseling:   Discussion was held with the patient today regarding concussion in general including types of injury, symptoms that are common, treatment and variability in time to recover  I have reassured the patient her symptoms are very common when a concussion is present and will improve with time. We discussed the risks and benefits of possible medication used to help concussive symptoms including risk of worsening depression with medication adjustments and even the possibility of emergence of suicidal ideations. We will assess for the appropriateness of possible psychotropic medication trials/changes. The patient will seek out appropriate emergency services should that become necessary. The patient agrees to call/message before her next visit with any questions, concerns or problems.    Visit Details:     Type of service: Video Visit    Video Start  Time: 1016    Video End Time:  1033    Originating Location: Patient's home    Distant Location:  [facility]  [place]    Mode of Communication: Video Conference via  [employer] [facility] (My Chart)     Diagnosis managed and treated at today's visit :  Post concussion syndrome  Post concussion headache  Nausea  Dizziness  Fatigue  Insomnia  Sensitivity to light  Sound sensitivity  Concentration and Attention deficit  Memory difficulties  Anxiety d/t a medical condition  Irritability    Total time today (25 min) in this patient encounter was spent on pre-charting, chart review, review of outside records, review of test results, interpretation of tests, patient visit and documentation and counseling and/or coordination of care. The patient is in agreement with this plan and has no further questions.    General Information:   Today you had your appointment with Christal Casas CNP     If lab work was done today as part of your evaluation you will generally be contacted via My Chart, mail, or phone with the results within 1-5 days. If there is an alarming result we will contact you by phone. Lab results come back at varying times, I generally wait until all labs are resulted before making comments on results. Please note labs are automatically released to My Chart once available.     If you need refills please contact your pharmacist. They will send a refill request to me to review. If it is a controlled substance please message me through [employer]oquo. Please allow 3 business days for us to process all refill requests.     Please call or send a medical message through My Chart, with any questions or concerns    If you need any paperwork completed please fax forms to [phone]. Please state if you would like a copy of the completed paperwork, mailed or faxed back to the patient and a fax number to fax the paperwork to. Please allow up to 10 business days for paperwork to be completed.    Christal Casas  GEORGE MENDEZ      [facility]  [facility] [address]  [address] [place] [address]  Office: [phone]  Fax: [phone]          Again, thank you for allowing me to participate in the care of your patient.        Sincerely,        ANDREA Martell CNP     poor balance

## 2023-01-18 ENCOUNTER — VIRTUAL VISIT (OUTPATIENT)
Dept: NEUROLOGY | Facility: CLINIC | Age: 83
End: 2023-01-18
Payer: MEDICARE

## 2023-01-18 DIAGNOSIS — G31.84 MCI (MILD COGNITIVE IMPAIRMENT): Primary | ICD-10-CM

## 2023-01-18 PROCEDURE — 99212 OFFICE O/P EST SF 10 MIN: CPT | Mod: 95 | Performed by: NURSE PRACTITIONER

## 2023-01-18 NOTE — LETTER
"    1/18/2023         RE: Mercedes Gomez  [address] [facility] [address]        Dear Colleague,    Thank you for referring your patient, Mercedes Gomez, to the [facility]. Please see a copy of my visit note below.    Visit start time-1016  Video Visit: Concussion Follow up:   Mercedes Gomez is a 81 year old female who is being evaluated via a billable video visit       The patient has been notified of the following:     \"This video visit will be conducted via a video call between you and your provider. We have found that certain health care needs can be provided without the need for a physical exam.  This service lets us provide the care you need with a short video conversation.  If a prescription is necessary we can send it directly to your pharmacy.  If lab work is needed we can place an order for that and you can then stop by our lab to have the test done at a later time.    If during the course of the call the provider feels a video visit is not appropriate, you will not be charged for this service.\"     Patient has given verbal consent to a video visit? Yes    Visit Check In:   Orders from previous visit:Check B12 again, SL B12, patient will think about taking ST if she does not think cognition has returned to baseline.  Neuropsychological assessment completed    Yes   PT   Completed Yes   OT   Completed Yes   ST   Completed No   Psychology  No   Return to Work/School   Full scheduled hours  Patient is retired    Currently on medication to help with sleep    No      Currently on any mental health medications     No      Currently on medication for attention, ADD/ADHD    No                                                     Outpatient Follow up Mild TBI (Concussion)  Evaluation:   Mercedes MELENDEZ Jairrohinimat chief complaint is Post Concussion Syndrome     Is patient on a controlled substance prescribed by me?  No     HPI:      Pertinent History:  Mercedes" AL Gomez is a 81 year old female with documented past medical history which includes atrial fibrillation on chronic anticoagulation therapy via Xarelto who presents to the department for evaluation of headache after fall on Sunday.  Patient explains that 5 days ago she was carrying an appliance in her house when she tripped over a stool causing her to twist and fall forward, the right side of her head struck a nearby antique ashtray.  She also landed on buttocks causing low back pain, however she attributes this to chronic low back pain as the location and sensation seem unchanged.  She reports that her  heard the incident and came to find her laying on the ground, no loss of consciousness or confusion thereafter.  The patient was helped upright and had some mild right knee pain which has since resolved.  She has had intermittent achy frontal head pains since the fall for which she has been taking acetaminophen for moderate relief.  Low back pain it is only intermittent and acetaminophen helps with that as well.  She specifically denies fever, chills, chest pain, shortness of breath, abdominal pain, leg weakness or sensory deficits.     Date of accident :  1/8/2022    Plan:        We discussed some treatment options and have elected to  Patient will be discharged from clinic, all care transferred back to primary    Medication Adjustment:  No medication changes    Return to Work/School  Patient is retired    Return to clinic  If patient has any return of symptoms, problems, questions, concerns or another concussion    Continue with the support of the clinic, reassurance, and redirection. Staff monitoring and ongoing assessments per team plan. This team will utilize appropriate emergency services if necessary. I will make myself available if concerns or problems arise.  The patient agrees to call/message before her next visit with any questions, concerns or problems.    Progress Note:        The patient  returns to the concussion clinic for a follow up visit, She was last seen by me on 10/17/2022, the patient was started on sublingual vitamin B12.  The patient's B12 level is now within normal limits.  Patient reports that cognition is back to her baseline.  Patient is denying any physical, cognitive, emotional concussive symptoms.  Patient will be discharged from clinic all care be transferred back to primary.     Subjective:        Overall improvement from last visit   Yes     Physical Symptoms:  Headache-No       Nausea-No               Balance problems - No         Dizziness - No              Visual problems - No        Fatigue - No              Sensitivity to light - No            Sensitivity to sound - No       Numbness/tingling - No            Cognitive Symptoms  Feeling mentally foggy -No       Feeling slowed down -No        Difficulty Concentrating- No     Difficulty remembering - No         Emotional Symptoms  Irritability - No             Sadness-  No          More emotional - No        Nervousness/anxiety -No       Sleep History:  Sleep less than usual - No    Sleep more than usual - No    Trouble falling asleep - No      Trouble staying asleep - No     Wake feeling rested - most of the time            Exertion:         Do the above stated symptoms worsen with physical activity? No          Do the above stated symptoms worsen with cognitive activity? 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  stable by ultrasound 11/17; recommend repeat ultrasound 11/19       Severe obesity (BMI 35.0-39.9) with comorbidity (H) 02/24/2017     Priority: Medium     Overactive bladder 04/28/2014     Priority: Medium     Osteopenia 10/22/2013     Priority: Medium     Anxiety 08/26/2013     Priority: Medium     Paroxysmal atrial fibrillation (H) 08/26/2013     Priority: Medium     Humerus fracture 09/17/2012     Priority: Medium     Proximal humeral fracture 09/17/2012     Priority: Medium     Knee pain, right 10/10/2011     Priority: Medium     Insulin pump status 05/08/2009     Priority: Medium     Formatting of this note might be different from the original.  PLACED 4-       Type 1 diabetes mellitus without complication (H) 10/01/2008     Priority: Medium     Formatting of this note might be different from the original.  diagnosis 1991 with type 2; c-peptide low as of 2008  history of albuminuria       Hyperlipidemia due to type 1 diabetes mellitus (H) 09/25/2007     Priority: Medium     No past medical history on file.  No past surgical history on file.  Family History   Problem Relation Age of Onset     Alzheimer Disease Mother      Current Outpatient Medications   Medication Sig Dispense Refill     ACCU-CHEK ACTIVE CHECK 4-6 TIMES DAILY       Continuous Blood Gluc  (DEXCOM G6 ) FABIAN As directed. This is a replacement ; she does not need the sensors or transmitter.       Continuous Blood Gluc Sensor (DEXCOM G6 SENSOR) MISC As directed.       cyanocobalamin 1000 MCG SUBL sublingual tablet Place 1 tablet (1,000 mcg) under the tongue daily 90 tablet 3     insulin aspart (NOVOLOG VIAL) 100 UNITS/ML vial INJECT UP TO 60 UNITS VIA PUMP DAILY       metoprolol succinate ER  (TOPROL-XL) 25 MG 24 hr tablet Take 2 tablets (50 mg) by mouth daily 30 tablet 0     NOVOLOG 100 UNIT/ML SC SOLN via PUMP, up to 60 units daily       pravastatin (PRAVACHOL) 40 MG tablet Take 40 mg by mouth       rivaroxaban ANTICOAGULANT (XARELTO) 20 MG TABS tablet 1 tablet with food       rivaroxaban ANTICOAGULANT (XARELTO) 20 MG TABS tablet TAKE 1 TABLET BY MOUTH ONCE DAILY WITH EVENING MEAL.       SUBCUTANEOUS INF PUMP SUPPLIES PT HAS ANIMAS PUMP       aspirin (ASA) 81 MG EC tablet Take 81 mg by mouth (Patient not taking: Reported on 10/17/2022)       calcium carbonate 1250 (500 Ca) MG CHEW Take 1,250 mg by mouth (Patient not taking: Reported on 1/18/2023)       CRANBERRY OR 1680 MG ORALLY DAILY (Patient not taking: No sig reported)       lisinopril (ZESTRIL) 5 MG tablet Take 5 mg by mouth (Patient not taking: Reported on 3/28/2022)       vitamin B-12 (CYANOCOBALAMIN) 500 MCG tablet Take 500 mcg by mouth (Patient not taking: Reported on 10/17/2022)       VITAMIN D 1000 UNIT OR CAPS 2 CAPSULES DAILY (Patient not taking: Reported on 10/17/2022)       Social History     Socioeconomic History     Marital status:      Spouse name: Not on file     Number of children: Not on file     Years of education: Not on file     Highest education level: Not on file   Occupational History     Not on file   Tobacco Use     Smoking status: Never     Smokeless tobacco: Never   Substance and Sexual Activity     Alcohol use: No     Drug use: No     Sexual activity: Not on file   Other Topics Concern     Parent/sibling w/ CABG, MI or angioplasty before 65F 55M? Not Asked   Social History Narrative     Not on file     Social Determinants of Health     Financial Resource Strain: Not on file   Food Insecurity: Not on file   Transportation Needs: Not on file   Physical Activity: Not on file   Stress: Not on file   Social Connections: Not on file   Intimate Partner Violence: Not on file   Housing Stability: Not on file        ALLERGIES  Adhesive tape and Sulfa drugs    The following portions of the patient's history were reviewed and updated as appropriate: allergies, current medications, past family history, past medical history, past social history, past surgical history and problem list.    Review of Systems  A comprehensive review of systems was negative except for: What is noted above    Mental Status Examination  Alertness:  alert  and oriented  Appearance:  well groomed  Behavior/Demeanor:  cooperative, pleasant and calm, with good  eye contact.  Speech:  normal  Psychomotor:  normal or unremarkable    Mood:  good  Affect:  appropriate and was congruent to speech content.  Thought Process/Associations: unremarkable   Thought Content: devoid of  suicidal and violent ideation and delusions.   Perception: devoid of  hallucinations  Insight:  good.  Judgment: good.  Attention/Concentration:  Normal  Language:  Intact  Fund of Knowledge:  Average.    Memory:  Immediate recall intact, Short-term memory intact and Long-term memory intact.       Counseling:   Discussion was held with the patient today regarding concussion in general including types of injury, symptoms that are common, treatment and variability in time to recover  I have reassured the patient her symptoms are very common when a concussion is present and will improve with time. We discussed the risks and benefits of possible medication used to help concussive symptoms including risk of worsening depression with medication adjustments and even the possibility of emergence of suicidal ideations. We will assess for the appropriateness of possible psychotropic medication trials/changes. The patient will seek out appropriate emergency services should that become necessary. The patient agrees to call/message before her next visit with any questions, concerns or problems.    Visit Details:   Type of service: Video Visit    Video Start Time: 1022    Video End Time:   1027    Originating Location: Patient's home    Distant Location:  [facility]  [place]    Mode of Communication: Video Conference via  [facility] (My Chart)     Diagnosis managed and treated at today's visit :  Post concussion syndrome  Post concussion headache  Nausea  Dizziness  Fatigue  Insomnia  Sensitivity to light  Sound sensitivity  Concentration and Attention deficit  Memory difficulties  Anxiety d/t a medical condition  Irritability    Total time today (10 min) in this patient encounter was spent on pre-charting, chart review, review of outside records, review of test results, interpretation of tests, patient visit and documentation and counseling and/or coordination of care. The patient is in agreement with this plan and has no further questions.    General Information:   Today you had your appointment with Christal Casas CNP     If lab work was done today as part of your evaluation you will generally be contacted via My Chart, mail, or phone with the results within 1-5 days. If there is an alarming result we will contact you by phone. Lab results come back at varying times, I generally wait until all labs are resulted before making comments on results. Please note labs are automatically released to My Chart once available.     If you need refills please contact your pharmacist. They will send a refill request to me to review. If it is a controlled substance please message me through [employer]. Please allow 3 business days for us to process all refill requests.     Please call or send a medical message through My Chart, with any questions or concerns    If you need any paperwork completed please fax forms to [phone]. Please state if you would like a copy of the completed paperwork, mailed or faxed back to the patient and a fax number to fax the paperwork to. Please allow up to 10 business days for paperwork to be completed.    ANDREA Martell CNP      [facility]  [facility]  [facility] [address]  [address] [place] [address]  Office: [phone]  Fax: [phone]          Again, thank you for allowing me to participate in the care of your patient.        Sincerely,        ANDREA Martell CNP

## 2023-01-18 NOTE — PROGRESS NOTES
"Visit start time-1016  Video Visit: Concussion Follow up:   Mercedes Gomez is a 81 year old female who is being evaluated via a billable video visit       The patient has been notified of the following:     \"This video visit will be conducted via a video call between you and your provider. We have found that certain health care needs can be provided without the need for a physical exam.  This service lets us provide the care you need with a short video conversation.  If a prescription is necessary we can send it directly to your pharmacy.  If lab work is needed we can place an order for that and you can then stop by our lab to have the test done at a later time.    If during the course of the call the provider feels a video visit is not appropriate, you will not be charged for this service.\"     Patient has given verbal consent to a video visit? Yes    Visit Check In:   Orders from previous visit:Check B12 again, SL B12, patient will think about taking ST if she does not think cognition has returned to baseline.  Neuropsychological assessment completed    Yes   PT   Completed Yes   OT   Completed Yes   ST   Completed No   Psychology  No   Return to Work/School   Full scheduled hours  Patient is retired    Currently on medication to help with sleep    No      Currently on any mental health medications     No      Currently on medication for attention, ADD/ADHD    No                                                     Outpatient Follow up Mild TBI (Concussion)  Evaluation:   Mercedes Gomez chief complaint is Post Concussion Syndrome     Is patient on a controlled substance prescribed by me?  No     HPI:      Pertinent History:  Mercedes Gomez is a 81 year old female with documented past medical history which includes atrial fibrillation on chronic anticoagulation therapy via Xarelto who presents to the department for evaluation of headache after fall on Sunday.  Patient explains that 5 days ago she " was carrying an appliance in her house when she tripped over a stool causing her to twist and fall forward, the right side of her head struck a nearby antique ashtray.  She also landed on buttocks causing low back pain, however she attributes this to chronic low back pain as the location and sensation seem unchanged.  She reports that her  heard the incident and came to find her laying on the ground, no loss of consciousness or confusion thereafter.  The patient was helped upright and had some mild right knee pain which has since resolved.  She has had intermittent achy frontal head pains since the fall for which she has been taking acetaminophen for moderate relief.  Low back pain it is only intermittent and acetaminophen helps with that as well.  She specifically denies fever, chills, chest pain, shortness of breath, abdominal pain, leg weakness or sensory deficits.     Date of accident :  1/8/2022    Plan:        We discussed some treatment options and have elected to  Patient will be discharged from clinic, all care transferred back to primary    Medication Adjustment:  No medication changes    Return to Work/School  Patient is retired    Return to clinic  If patient has any return of symptoms, problems, questions, concerns or another concussion    Continue with the support of the clinic, reassurance, and redirection. Staff monitoring and ongoing assessments per team plan. This team will utilize appropriate emergency services if necessary. I will make myself available if concerns or problems arise.  The patient agrees to call/message before her next visit with any questions, concerns or problems.    Progress Note:        The patient returns to the concussion clinic for a follow up visit, She was last seen by me on 10/17/2022, the patient was started on sublingual vitamin B12.  The patient's B12 level is now within normal limits.  Patient reports that cognition is back to her baseline.  Patient is denying  any physical, cognitive, emotional concussive symptoms.  Patient will be discharged from clinic all care be transferred back to primary.     Subjective:        Overall improvement from last visit   Yes     Physical Symptoms:  Headache-No       Nausea-No               Balance problems - No         Dizziness - No              Visual problems - No        Fatigue - No              Sensitivity to light - No            Sensitivity to sound - No       Numbness/tingling - No            Cognitive Symptoms  Feeling mentally foggy -No       Feeling slowed down -No        Difficulty Concentrating- No     Difficulty remembering - No         Emotional Symptoms  Irritability - No             Sadness-  No          More emotional - No        Nervousness/anxiety -No       Sleep History:  Sleep less than usual - No    Sleep more than usual - No    Trouble falling asleep - No      Trouble staying asleep - No     Wake feeling rested - most of the time            Exertion:         Do the above stated symptoms worsen with physical activity? No          Do the above stated symptoms worsen with cognitive activity? 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  stable by ultrasound 11/17; recommend repeat ultrasound  11/19       Severe obesity (BMI 35.0-39.9) with comorbidity (H) 02/24/2017     Priority: Medium     Overactive bladder 04/28/2014     Priority: Medium     Osteopenia 10/22/2013     Priority: Medium     Anxiety 08/26/2013     Priority: Medium     Paroxysmal atrial fibrillation (H) 08/26/2013     Priority: Medium     Humerus fracture 09/17/2012     Priority: Medium     Proximal humeral fracture 09/17/2012     Priority: Medium     Knee pain, right 10/10/2011     Priority: Medium     Insulin pump status 05/08/2009     Priority: Medium     Formatting of this note might be different from the original.  PLACED 4-       Type 1 diabetes mellitus without complication (H) 10/01/2008     Priority: Medium     Formatting of this note might be different from the original.  diagnosis 1991 with type 2; c-peptide low as of 2008  history of albuminuria       Hyperlipidemia due to type 1 diabetes mellitus (H) 09/25/2007     Priority: Medium     No past medical history on file.  No past surgical history on file.  Family History   Problem Relation Age of Onset     Alzheimer Disease Mother      Current Outpatient Medications   Medication Sig Dispense Refill     ACCU-CHEK ACTIVE CHECK 4-6 TIMES DAILY       Continuous Blood Gluc  (DEXCOM G6 ) FABIAN As directed. This is a replacement ; she does not need the sensors or transmitter.       Continuous Blood Gluc Sensor (DEXCOM G6 SENSOR) MISC As directed.       cyanocobalamin 1000 MCG SUBL sublingual tablet Place 1 tablet (1,000 mcg) under the tongue daily 90 tablet 3     insulin aspart (NOVOLOG VIAL) 100 UNITS/ML vial INJECT UP TO 60 UNITS VIA PUMP DAILY       metoprolol succinate ER (TOPROL-XL) 25 MG 24 hr tablet Take 2 tablets (50 mg) by mouth daily 30 tablet 0     NOVOLOG 100 UNIT/ML SC SOLN via PUMP, up to 60 units daily       pravastatin (PRAVACHOL) 40 MG tablet Take 40 mg by mouth       rivaroxaban ANTICOAGULANT (XARELTO) 20 MG TABS tablet 1 tablet with food        rivaroxaban ANTICOAGULANT (XARELTO) 20 MG TABS tablet TAKE 1 TABLET BY MOUTH ONCE DAILY WITH EVENING MEAL.       SUBCUTANEOUS INF PUMP SUPPLIES PT HAS ANIMAS PUMP       aspirin (ASA) 81 MG EC tablet Take 81 mg by mouth (Patient not taking: Reported on 10/17/2022)       calcium carbonate 1250 (500 Ca) MG CHEW Take 1,250 mg by mouth (Patient not taking: Reported on 1/18/2023)       CRANBERRY OR 1680 MG ORALLY DAILY (Patient not taking: No sig reported)       lisinopril (ZESTRIL) 5 MG tablet Take 5 mg by mouth (Patient not taking: Reported on 3/28/2022)       vitamin B-12 (CYANOCOBALAMIN) 500 MCG tablet Take 500 mcg by mouth (Patient not taking: Reported on 10/17/2022)       VITAMIN D 1000 UNIT OR CAPS 2 CAPSULES DAILY (Patient not taking: Reported on 10/17/2022)       Social History     Socioeconomic History     Marital status:      Spouse name: Not on file     Number of children: Not on file     Years of education: Not on file     Highest education level: Not on file   Occupational History     Not on file   Tobacco Use     Smoking status: Never     Smokeless tobacco: Never   Substance and Sexual Activity     Alcohol use: No     Drug use: No     Sexual activity: Not on file   Other Topics Concern     Parent/sibling w/ CABG, MI or angioplasty before 65F 55M? Not Asked   Social History Narrative     Not on file     Social Determinants of Health     Financial Resource Strain: Not on file   Food Insecurity: Not on file   Transportation Needs: Not on file   Physical Activity: Not on file   Stress: Not on file   Social Connections: Not on file   Intimate Partner Violence: Not on file   Housing Stability: Not on file       ALLERGIES  Adhesive tape and Sulfa drugs    The following portions of the patient's history were reviewed and updated as appropriate: allergies, current medications, past family history, past medical history, past social history, past surgical history and problem list.    Review of Systems  A  comprehensive review of systems was negative except for: What is noted above    Mental Status Examination  Alertness:  alert  and oriented  Appearance:  well groomed  Behavior/Demeanor:  cooperative, pleasant and calm, with good  eye contact.  Speech:  normal  Psychomotor:  normal or unremarkable    Mood:  good  Affect:  appropriate and was congruent to speech content.  Thought Process/Associations: unremarkable   Thought Content: devoid of  suicidal and violent ideation and delusions.   Perception: devoid of  hallucinations  Insight:  good.  Judgment: good.  Attention/Concentration:  Normal  Language:  Intact  Fund of Knowledge:  Average.    Memory:  Immediate recall intact, Short-term memory intact and Long-term memory intact.       Counseling:   Discussion was held with the patient today regarding concussion in general including types of injury, symptoms that are common, treatment and variability in time to recover  I have reassured the patient her symptoms are very common when a concussion is present and will improve with time. We discussed the risks and benefits of possible medication used to help concussive symptoms including risk of worsening depression with medication adjustments and even the possibility of emergence of suicidal ideations. We will assess for the appropriateness of possible psychotropic medication trials/changes. The patient will seek out appropriate emergency services should that become necessary. The patient agrees to call/message before her next visit with any questions, concerns or problems.    Visit Details:   Type of service: Video Visit    Video Start Time: 1022    Video End Time:  1027    Originating Location: Patient's home    Distant Location:  [facility]  [place]    Mode of Communication: Video Conference via  [employer] (My Chart)     Diagnosis managed and treated at today's visit :  Post concussion syndrome  Post concussion  headache  Nausea  Dizziness  Fatigue  Insomnia  Sensitivity to light  Sound sensitivity  Concentration and Attention deficit  Memory difficulties  Anxiety d/t a medical condition  Irritability    Total time today (10 min) in this patient encounter was spent on pre-charting, chart review, review of outside records, review of test results, interpretation of tests, patient visit and documentation and counseling and/or coordination of care. The patient is in agreement with this plan and has no further questions.    General Information:   Today you had your appointment with Christal Casas CNP     If lab work was done today as part of your evaluation you will generally be contacted via My Chart, mail, or phone with the results within 1-5 days. If there is an alarming result we will contact you by phone. Lab results come back at varying times, I generally wait until all labs are resulted before making comments on results. Please note labs are automatically released to My Chart once available.     If you need refills please contact your pharmacist. They will send a refill request to me to review. If it is a controlled substance please message me through [employer]. Please allow 3 business days for us to process all refill requests.     Please call or send a medical message through My Chart, with any questions or concerns    If you need any paperwork completed please fax forms to [phone]. Please state if you would like a copy of the completed paperwork, mailed or faxed back to the patient and a fax number to fax the paperwork to. Please allow up to 10 business days for paperwork to be completed.    ANDREA Martell, CNP      [facility]  [facility] [address]  [address] [place] [address]  Office: [phone]  Fax: [phone]

## 2023-01-18 NOTE — NURSING NOTE
CONCUSSION SYMPTOMS ASSESSMENT 4/11/2022 10/17/2022 1/18/2023   Headache or Pressure In Head 0 - none 0 - none 0 - none   Upset Stomach or Throwing Up 0 - none 0 - none 0 - none   Problems with Balance 0 - none 0 - none 0 - none   Feeling Dizzy 0 - none 0 - none 0 - none   Sensitivity to Light 0 - none 0 - none 0 - none   Sensitivity to Noise 0 - none 0 - none 0 - none   Mood Changes 0 - none 0 - none 0 - none   Feeling sluggish, hazy, or foggy 1 - mild 0 - none 0 - none   Trouble Concentrating, Lack of Focus 1 - mild 0 - none 0 - none   Motion Sickness 0 - none 0 - none 0 - none   Vision Changes 0 - none 0 - none 0 - none   Memory Problems 1 - mild 0 - none 0 - none   Feeling Confused 1 - mild 0 - none 0 - none   Neck Pain 0 - none 0 - none 0 - none   Trouble Sleeping 1 - mild 1 - mild 1 - mild   Total Number of Symptoms 5 1 1   Symptom Severity Score 5 1 1

## 2023-04-24 ENCOUNTER — HOSPITAL ENCOUNTER (OUTPATIENT)
Dept: MRI IMAGING | Facility: CLINIC | Age: 83
Discharge: HOME | End: 2023-04-24
Attending: STUDENT IN AN ORGANIZED HEALTH CARE EDUCATION/TRAINING PROGRAM | Admitting: STUDENT IN AN ORGANIZED HEALTH CARE EDUCATION/TRAINING PROGRAM
Payer: MEDICARE

## 2023-04-24 DIAGNOSIS — M54.50 LOW BACK PAIN: ICD-10-CM

## 2023-04-24 PROCEDURE — 72148 MRI LUMBAR SPINE W/O DYE: CPT
